# Patient Record
Sex: FEMALE | Race: OTHER | HISPANIC OR LATINO
[De-identification: names, ages, dates, MRNs, and addresses within clinical notes are randomized per-mention and may not be internally consistent; named-entity substitution may affect disease eponyms.]

---

## 2018-04-02 PROBLEM — Z00.00 ENCOUNTER FOR PREVENTIVE HEALTH EXAMINATION: Status: ACTIVE | Noted: 2018-04-02

## 2018-04-09 ENCOUNTER — APPOINTMENT (OUTPATIENT)
Dept: ORTHOPEDIC SURGERY | Facility: CLINIC | Age: 58
End: 2018-04-09
Payer: COMMERCIAL

## 2018-04-09 DIAGNOSIS — Z87.39 PERSONAL HISTORY OF OTHER DISEASES OF THE MUSCULOSKELETAL SYSTEM AND CONNECTIVE TISSUE: ICD-10-CM

## 2018-04-09 DIAGNOSIS — Z80.0 FAMILY HISTORY OF MALIGNANT NEOPLASM OF DIGESTIVE ORGANS: ICD-10-CM

## 2018-04-09 DIAGNOSIS — M65.312 TRIGGER THUMB, LEFT THUMB: ICD-10-CM

## 2018-04-09 DIAGNOSIS — Z87.09 PERSONAL HISTORY OF OTHER DISEASES OF THE RESPIRATORY SYSTEM: ICD-10-CM

## 2018-04-09 DIAGNOSIS — M65.4 RADIAL STYLOID TENOSYNOVITIS [DE QUERVAIN]: ICD-10-CM

## 2018-04-09 PROCEDURE — 99204 OFFICE O/P NEW MOD 45 MIN: CPT | Mod: 25

## 2018-04-09 PROCEDURE — 76882 US LMTD JT/FCL EVL NVASC XTR: CPT | Mod: LT

## 2018-04-09 PROCEDURE — 20550 NJX 1 TENDON SHEATH/LIGAMENT: CPT | Mod: LT

## 2018-04-09 PROCEDURE — 76942 ECHO GUIDE FOR BIOPSY: CPT | Mod: 59,LT

## 2018-04-11 PROBLEM — M65.312 TRIGGER FINGER OF LEFT THUMB: Status: ACTIVE | Noted: 2018-04-09

## 2018-04-11 PROBLEM — M65.4 DE QUERVAIN'S TENOSYNOVITIS, LEFT: Status: ACTIVE | Noted: 2018-04-09

## 2020-01-23 ENCOUNTER — APPOINTMENT (OUTPATIENT)
Dept: THORACIC SURGERY | Facility: CLINIC | Age: 60
End: 2020-01-23
Payer: COMMERCIAL

## 2020-01-23 VITALS
DIASTOLIC BLOOD PRESSURE: 67 MMHG | TEMPERATURE: 98 F | BODY MASS INDEX: 30.21 KG/M2 | WEIGHT: 160 LBS | RESPIRATION RATE: 17 BRPM | HEIGHT: 61 IN | SYSTOLIC BLOOD PRESSURE: 104 MMHG | OXYGEN SATURATION: 97 % | HEART RATE: 75 BPM

## 2020-01-23 DIAGNOSIS — D21.9 BENIGN NEOPLASM OF CONNECTIVE AND OTHER SOFT TISSUE, UNSPECIFIED: ICD-10-CM

## 2020-01-23 PROCEDURE — 99205 OFFICE O/P NEW HI 60 MIN: CPT

## 2020-01-24 PROBLEM — D21.9 ELASTOFIBROMA: Status: ACTIVE | Noted: 2020-01-24

## 2020-01-24 RX ORDER — OXYBUTYNIN CHLORIDE 2.5 MG/1
TABLET ORAL
Refills: 0 | Status: ACTIVE | COMMUNITY

## 2020-01-24 RX ORDER — ATORVASTATIN CALCIUM 20 MG/1
20 TABLET, FILM COATED ORAL
Refills: 0 | Status: ACTIVE | COMMUNITY

## 2020-01-24 RX ORDER — PROPRANOLOL HYDROCHLORIDE 80 MG/1
TABLET ORAL
Refills: 0 | Status: ACTIVE | COMMUNITY

## 2020-01-27 NOTE — CONSULT LETTER
[Dear  ___] : Dear  [unfilled], [( Thank you for referring [unfilled] for consultation for _____ )] : Thank you for referring [unfilled] for consultation for [unfilled] [Consult Letter:] : I had the pleasure of evaluating your patient, [unfilled]. [Please see my note below.] : Please see my note below. [Consult Closing:] : Thank you very much for allowing me to participate in the care of this patient.  If you have any questions, please do not hesitate to contact me. [Sincerely,] : Sincerely, [FreeTextEntry2] : Dr. Boom Dougherty (GI/ref)\par Dr. Edward Perkins (CT Sx) [FreeTextEntry3] : Demarco Gonzales MD, MPH \par System Director of Thoracic Surgery \par Director of Comprehensive Lung and Foregut [place] \par Professor Cardiovascular & Thoracic Surgery  \par [facility] [employer] at [facility]\par

## 2020-01-27 NOTE — HISTORY OF PRESENT ILLNESS
[FreeTextEntry1] : Ms. YULISSA TSE, 59 year old female, never smoker, w/ hx of HLD, who presented to PCP for persistent back pain. \par \par MRI Chest wall on 12/12/19:\par - well-circumscribed crescent shaped lesions seen deep to the posterolateral serratus anterior musculature bilaterally, 5.6 cm on Rt and 4.3 on the Lt, c/w with elatofibromas\par \par Of note, Pt has seen Dr. Edward Perkins at [place], was recommended for surgery. \par \par Pt presents today for 2nd opinion, referred by Dr. Boom Dougherty. Pt admits to pain to the back, denies SOB, cough, fever, chills, weight loss or CP. \par

## 2020-01-27 NOTE — ASSESSMENT
[FreeTextEntry1] : Ms. YULISSA TSE, 59 year old female, never smoker, w/ hx of HLD, who presented to PCP for persistent back pain. \par \par MRI Chest wall on 12/12/19:\par - well-circumscribed crescent shaped lesions seen deep to the posterolateral serratus anterior musculature bilaterally, 5.6 cm on Rt and 4.3 on the Lt, c/w with elatofibromas\par \par Of note, Pt has seen Dr. Edward Perkins at [place], was recommended for surgery. As per pt, there are two masses on the Rt side and Dr. Perkins would remove two masses at the same time. However, there is only one mass noted on MRI. \par \par I have reviewed the patient's medical records and diagnostic images at time of this office consultation and have made the following recommendation:\par 1. MRI reviewed with pt, only one soft tissue mass noted to Rt side of chest wall. Pt will bring old CT scan to review and compare. I recommended resection of Rt chest wall soft tissue mass on 2/20/20. Risks and benefits and alternatives explained to patient, all questions answered. \par 2. Medical clearance \par 3. CT Chest w/contrast. \par \par \par I personally performed the services described in the documentation, reviewed the documentation recorded by the scribe in my presence and it accurately and completely records my words and actions. \par \par I, Kareen Foss NP, am scribing for and the presence of Dr. Demarco Gonzales the following sections, HISTORY OF PRESENT ILLNESS, PAST MEDICAL/FAMILY/SOCIAL HISTORY; REVIEW OF SYSTEMS; VITAL SIGNS; PHYSICAL EXAM; DISPOSITION.\par

## 2020-01-27 NOTE — PHYSICAL EXAM
[General Appearance - Alert] : alert [General Appearance - In No Acute Distress] : in no acute distress [Sclera] : the sclera and conjunctiva were normal [PERRL With Normal Accommodation] : pupils were equal in size, round, and reactive to light [Extraocular Movements] : extraocular movements were intact [Outer Ear] : the ears and nose were normal in appearance [Oropharynx] : the oropharynx was normal [Neck Appearance] : the appearance of the neck was normal [Neck Cervical Mass (___cm)] : no neck mass was observed [Jugular Venous Distention Increased] : there was no jugular-venous distention [Thyroid Diffuse Enlargement] : the thyroid was not enlarged [Thyroid Nodule] : there were no palpable thyroid nodules [Auscultation Breath Sounds / Voice Sounds] : lungs were clear to auscultation bilaterally [Heart Rate And Rhythm] : heart rate was normal and rhythm regular [Heart Sounds] : normal S1 and S2 [Heart Sounds Gallop] : no gallops [Murmurs] : no murmurs [Heart Sounds Pericardial Friction Rub] : no pericardial rub [Chest Visual Inspection Thoracic Asymmetry] : no chest asymmetry [Examination Of The Chest] : the chest was normal in appearance [Diminished Respiratory Excursion] : normal chest expansion [2+] : left 2+ [Breast Palpation Mass] : no palpable masses [Breast Appearance] : normal in appearance [Bowel Sounds] : normal bowel sounds [Abdomen Soft] : soft [Abdomen Tenderness] : non-tender [Abdomen Mass (___ Cm)] : no abdominal mass palpated [Cervical Lymph Nodes Enlarged Posterior Bilaterally] : posterior cervical [Cervical Lymph Nodes Enlarged Anterior Bilaterally] : anterior cervical [Supraclavicular Lymph Nodes Enlarged Bilaterally] : supraclavicular [No CVA Tenderness] : no ~M costovertebral angle tenderness [No Spinal Tenderness] : no spinal tenderness [Abnormal Walk] : normal gait [Nail Clubbing] : no clubbing  or cyanosis of the fingernails [Musculoskeletal - Swelling] : no joint swelling seen [Motor Tone] : muscle strength and tone were normal [Skin Color & Pigmentation] : normal skin color and pigmentation [Skin Turgor] : normal skin turgor [] : no rash [Deep Tendon Reflexes (DTR)] : deep tendon reflexes were 2+ and symmetric [No Focal Deficits] : no focal deficits [Sensation] : the sensory exam was normal to light touch and pinprick [Oriented To Time, Place, And Person] : oriented to person, place, and time [Impaired Insight] : insight and judgment were intact [Affect] : the affect was normal [FreeTextEntry1] : deferred

## 2020-01-27 NOTE — DATA REVIEWED
[FreeTextEntry1] : MRI Chest wall on 12/12/19:\par - well-circumscribed crescent shaped lesions seen deep to the posterolateral serratus anterior musculature bilaterally, 5.6 cm on Rt and 4.3 on the Lt, c/w with elatofibromas

## 2024-01-30 PROBLEM — K21.9 GERD (GASTROESOPHAGEAL REFLUX DISEASE): Status: ACTIVE | Noted: 2024-01-30

## 2024-02-01 ENCOUNTER — APPOINTMENT (OUTPATIENT)
Dept: THORACIC SURGERY | Facility: CLINIC | Age: 64
End: 2024-02-01
Payer: COMMERCIAL

## 2024-02-01 VITALS
TEMPERATURE: 97 F | HEART RATE: 79 BPM | RESPIRATION RATE: 17 BRPM | SYSTOLIC BLOOD PRESSURE: 124 MMHG | OXYGEN SATURATION: 97 % | DIASTOLIC BLOOD PRESSURE: 75 MMHG | WEIGHT: 172 LBS | HEIGHT: 61 IN | BODY MASS INDEX: 32.47 KG/M2

## 2024-02-01 DIAGNOSIS — K21.9 GASTRO-ESOPHAGEAL REFLUX DISEASE W/OUT ESOPHAGITIS: ICD-10-CM

## 2024-02-01 DIAGNOSIS — Z86.39 PERSONAL HISTORY OF OTHER ENDOCRINE, NUTRITIONAL AND METABOLIC DISEASE: ICD-10-CM

## 2024-02-01 PROCEDURE — 99205 OFFICE O/P NEW HI 60 MIN: CPT

## 2024-02-01 RX ORDER — ASPIRIN 325 MG/1
TABLET, FILM COATED ORAL
Refills: 0 | Status: DISCONTINUED | COMMUNITY
End: 2024-02-01

## 2024-02-01 RX ORDER — PANTOPRAZOLE SODIUM 40 MG/1
40 TABLET, DELAYED RELEASE ORAL
Refills: 0 | Status: ACTIVE | COMMUNITY

## 2024-02-01 NOTE — PHYSICAL EXAM
[Fully active, able to carry on all pre-disease performance without restriction] : Status 0 - Fully active, able to carry on all pre-disease performance without restriction [General Appearance - Alert] : alert [General Appearance - In No Acute Distress] : in no acute distress [General Appearance - Well Nourished] : well nourished [Sclera] : the sclera and conjunctiva were normal [PERRL With Normal Accommodation] : pupils were equal in size, round, and reactive to light [Outer Ear] : the ears and nose were normal in appearance [Neck Appearance] : the appearance of the neck was normal [Neck Cervical Mass (___cm)] : no neck mass was observed [Exaggerated Use Of Accessory Muscles For Inspiration] : no accessory muscle use [Respiration, Rhythm And Depth] : normal respiratory rhythm and effort [Auscultation Breath Sounds / Voice Sounds] : lungs were clear to auscultation bilaterally [Apical Impulse] : the apical impulse was normal [Heart Rate And Rhythm] : heart rate was normal and rhythm regular [Heart Sounds] : normal S1 and S2 [Examination Of The Chest] : the chest was normal in appearance [No Pulse Delay] : no pulse delay [Bowel Sounds] : normal bowel sounds [Abdomen Soft] : soft [Abdomen Tenderness] : non-tender [No CVA Tenderness] : no ~M costovertebral angle tenderness [Abnormal Walk] : normal gait [Involuntary Movements] : no involuntary movements were seen [Musculoskeletal - Swelling] : no joint swelling seen [Skin Color & Pigmentation] : normal skin color and pigmentation [Skin Turgor] : normal skin turgor [] : no rash [No Focal Deficits] : no focal deficits [Oriented To Time, Place, And Person] : oriented to person, place, and time [Impaired Insight] : insight and judgment were intact [Affect] : the affect was normal [Mood] : the mood was normal

## 2024-02-01 NOTE — CONSULT LETTER
[Dear  ___] : Dear  [unfilled], [Consult Letter:] : I had the pleasure of evaluating your patient, [unfilled]. [( Thank you for referring [unfilled] for consultation for _____ )] : Thank you for referring [unfilled] for consultation for [unfilled] [Please see my note below.] : Please see my note below. [Consult Closing:] : Thank you very much for allowing me to participate in the care of this patient.  If you have any questions, please do not hesitate to contact me. [FreeTextEntry2] : Dr. Boom Dougherty [FreeTextEntry3] : Demarco Gonzales MD, MPH  System Director of Thoracic Surgery  Director of Comprehensive Lung and Foregut [place]  Professor Cardiovascular & Thoracic Surgery  [facility] [employer] at [facility] [phone] [place] [address] [place] [address] Tel: [phone] Fax: [phone]

## 2024-02-01 NOTE — ASSESSMENT
[FreeTextEntry1] : Ms. YULISSA TSE, 63 year old female, never smoker, w/ hx of asthma, arthritis, osteoporosis, HLD, s/p right chest wall resection of elatofibroma in 3/2020 at [facility], who presented with chronic acid reflux for many years with recent worsening symptoms, controlled by PPI.   EGD on 12/10/22 by Dr. Boom Dougherty, which revealed: - GE junction is located at 36 cm from the incisors. Small hiatal hernia.    I have reviewed the patient's medical records and diagnostic images at the time of this office consultation and have made the following recommendation. Plan: 1. EGD, laparoscopy, robotic assist, hiatal hernia repair, Toupet fundoplication on 2/27/24. All risks vs. benefits and alternatives were explained to the patient, all questions were answered, patient verbalized understanding, was in agreement with the plan to proceed. 2. CT chest w/o contrast for further evaluation.  3. Upper GI series.  4. Refer to Dr. Patsy Florian for manometry.  5. Medical clearance and PST.     I, KAROLYN Yoon, personally performed the evaluation and management (E/M) services for this established patient who follow up today with an existing condition.  That E/M includes conducting the examination, assessing all new/exacerbated/existing conditions, and establishing a plan of care. Today, my ACP, Christina Stewart NP, was here to observe my evaluation and management services for this existing condition to be followed going forward.

## 2024-02-01 NOTE — HISTORY OF PRESENT ILLNESS
[FreeTextEntry1] : Ms. YULISSA TSE, 63 year old female, never smoker, w/ hx of asthma, arthritis, osteoporosis, HLD, s/p right chest wall resection of elatofibroma in 3/2020 at [facility], who presented with chronic acid reflux for many years with recent worsening symptoms, controlled by PPI.   EGD on 12/10/22 by Dr. Boom Dougherty, which revealed: - GE junction is located at 36 cm from the incisors. Small hiatal hernia.   She presents today for CT sx consultation. She is referred by Dr. Boom Dougherty. She admits acid reflux, mild cough. The patient denies SOB, cough, chest pain, hemoptysis, palpitation, fever, recent illness.

## 2024-02-02 ENCOUNTER — NON-APPOINTMENT (OUTPATIENT)
Age: 64
End: 2024-02-02

## 2024-02-05 ENCOUNTER — NON-APPOINTMENT (OUTPATIENT)
Age: 64
End: 2024-02-05

## 2024-02-06 ENCOUNTER — APPOINTMENT (OUTPATIENT)
Dept: GASTROENTEROLOGY | Facility: CLINIC | Age: 64
End: 2024-02-06

## 2024-02-07 ENCOUNTER — APPOINTMENT (OUTPATIENT)
Dept: GASTROENTEROLOGY | Facility: CLINIC | Age: 64
End: 2024-02-07
Payer: COMMERCIAL

## 2024-02-07 PROCEDURE — 99442: CPT

## 2024-02-13 ENCOUNTER — APPOINTMENT (OUTPATIENT)
Dept: GASTROENTEROLOGY | Facility: HOSPITAL | Age: 64
End: 2024-02-13

## 2024-02-13 ENCOUNTER — OUTPATIENT (OUTPATIENT)
Dept: OUTPATIENT SERVICES | Facility: HOSPITAL | Age: 64
LOS: 1 days | Discharge: HOME | End: 2024-02-13
Payer: COMMERCIAL

## 2024-02-13 VITALS
SYSTOLIC BLOOD PRESSURE: 124 MMHG | RESPIRATION RATE: 20 BRPM | DIASTOLIC BLOOD PRESSURE: 81 MMHG | WEIGHT: 169.98 LBS | OXYGEN SATURATION: 100 % | HEART RATE: 64 BPM | TEMPERATURE: 98 F | HEIGHT: 62 IN

## 2024-02-13 DIAGNOSIS — K21.9 GASTRO-ESOPHAGEAL REFLUX DISEASE WITHOUT ESOPHAGITIS: ICD-10-CM

## 2024-02-13 PROCEDURE — 91037 ESOPH IMPED FUNCTION TEST: CPT | Mod: 26

## 2024-02-13 PROCEDURE — 45337 SIGMOIDOSCOPY & DECOMPRESS: CPT

## 2024-02-13 PROCEDURE — 91010 ESOPHAGUS MOTILITY STUDY: CPT | Mod: 26

## 2024-02-28 ENCOUNTER — OUTPATIENT (OUTPATIENT)
Dept: OUTPATIENT SERVICES | Facility: HOSPITAL | Age: 64
LOS: 1 days | End: 2024-02-28

## 2024-02-28 VITALS
DIASTOLIC BLOOD PRESSURE: 76 MMHG | HEIGHT: 61 IN | HEART RATE: 51 BPM | TEMPERATURE: 98 F | WEIGHT: 171.08 LBS | OXYGEN SATURATION: 96 % | RESPIRATION RATE: 17 BRPM | SYSTOLIC BLOOD PRESSURE: 136 MMHG

## 2024-02-28 DIAGNOSIS — Z98.890 OTHER SPECIFIED POSTPROCEDURAL STATES: Chronic | ICD-10-CM

## 2024-02-28 DIAGNOSIS — D49.89 NEOPLASM OF UNSPECIFIED BEHAVIOR OF OTHER SPECIFIED SITES: Chronic | ICD-10-CM

## 2024-02-28 DIAGNOSIS — K44.9 DIAPHRAGMATIC HERNIA WITHOUT OBSTRUCTION OR GANGRENE: ICD-10-CM

## 2024-02-28 LAB
BLD GP AB SCN SERPL QL: NEGATIVE — SIGNIFICANT CHANGE UP
RH IG SCN BLD-IMP: POSITIVE — SIGNIFICANT CHANGE UP
RH IG SCN BLD-IMP: POSITIVE — SIGNIFICANT CHANGE UP

## 2024-02-28 NOTE — H&P PST ADULT - NSICDXPASTSURGICALHX_GEN_ALL_CORE_FT
PAST SURGICAL HISTORY:  H/O arthroscopy of right knee     Neoplasm of thorax     S/P trigger finger release      PAST SURGICAL HISTORY:  H/O arthroscopy of right knee     H/O nasal polypectomy     History of esophagogastroduodenoscopy (EGD)     Neoplasm of thorax     S/P trigger finger release

## 2024-02-28 NOTE — H&P PST ADULT - HISTORY OF PRESENT ILLNESS
62 y/o F with H/O: HLD, Migraines , GERD presents for pre o evaluation   c/o persistent cough since last year. Eval by pulm - all tests negative. Lungs clear". Pt was referred to surgeon by GI for further consultation.  62 y/o F with H/O: HLD, Migraines, GERD presents for pre o evaluation with c/o persistent cough since last year. Eval by pulm - all tests negative. Lungs clear". Pt was referred to surgeon by GI for further consultation. Pre op diagnosis: diaphragmatic hernia w/o obstruction or gangrene. Now schedule for Esophagogastroduodenoscopy laparoscopic robotic assisted hiatal hernia repair, Toupet fundoplication tentatively on 03/05/2024

## 2024-02-28 NOTE — H&P PST ADULT - NSICDXPASTMEDICALHX_GEN_ALL_CORE_FT
PAST MEDICAL HISTORY:  HLD (hyperlipidemia)     Migraines      PAST MEDICAL HISTORY:  Diaphragmatic hernia without obstruction and without gangrene     HLD (hyperlipidemia)     Migraines

## 2024-02-28 NOTE — H&P PST ADULT - CARDIOVASCULAR
regular rate and rhythm/S1 S2 present/no gallops details… regular rate and rhythm/S1 S2 present/no gallops/no murmur/no pedal edema

## 2024-02-28 NOTE — H&P PST ADULT - PROBLEM SELECTOR PLAN 1
Schedule for Esophagogastroduodenoscopy laparoscopic robotic assisted hiatal hernia repair, Toupet fundoplication tentatively on 03/05/24. Pre op instructions, chlorhexidine gluconate soap given and explained. Pt verbalized understanding.

## 2024-03-04 ENCOUNTER — TRANSCRIPTION ENCOUNTER (OUTPATIENT)
Age: 64
End: 2024-03-04

## 2024-03-04 NOTE — ASU PATIENT PROFILE, ADULT - NSICDXPASTMEDICALHX_GEN_ALL_CORE_FT
PAST MEDICAL HISTORY:  Diaphragmatic hernia without obstruction and without gangrene     HLD (hyperlipidemia)     Migraines

## 2024-03-04 NOTE — ASU PATIENT PROFILE, ADULT - FALL HARM RISK - UNIVERSAL INTERVENTIONS
Bed in lowest position, wheels locked, appropriate side rails in place/Call bell, personal items and telephone in reach/Instruct patient to call for assistance before getting out of bed or chair/Non-slip footwear when patient is out of bed/[place] to call system/Physically safe environment - no spills, clutter or unnecessary equipment/Purposeful Proactive Rounding/Room/bathroom lighting operational, light cord in reach

## 2024-03-04 NOTE — ASU PATIENT PROFILE, ADULT - NSICDXPASTSURGICALHX_GEN_ALL_CORE_FT
PAST SURGICAL HISTORY:  H/O arthroscopy of right knee     H/O nasal polypectomy     History of esophagogastroduodenoscopy (EGD)     Neoplasm of thorax     S/P trigger finger release

## 2024-03-05 ENCOUNTER — RESULT REVIEW (OUTPATIENT)
Age: 64
End: 2024-03-05

## 2024-03-05 ENCOUNTER — APPOINTMENT (OUTPATIENT)
Dept: THORACIC SURGERY | Facility: HOSPITAL | Age: 64
End: 2024-03-05

## 2024-03-05 ENCOUNTER — TRANSCRIPTION ENCOUNTER (OUTPATIENT)
Age: 64
End: 2024-03-05

## 2024-03-05 ENCOUNTER — INPATIENT (INPATIENT)
Facility: HOSPITAL | Age: 64
LOS: 0 days | Discharge: HOME | End: 2024-03-06
Attending: THORACIC SURGERY (CARDIOTHORACIC VASCULAR SURGERY) | Admitting: THORACIC SURGERY (CARDIOTHORACIC VASCULAR SURGERY)
Payer: COMMERCIAL

## 2024-03-05 VITALS
RESPIRATION RATE: 16 BRPM | SYSTOLIC BLOOD PRESSURE: 114 MMHG | OXYGEN SATURATION: 100 % | WEIGHT: 171.08 LBS | DIASTOLIC BLOOD PRESSURE: 68 MMHG | HEART RATE: 65 BPM | HEIGHT: 61 IN | TEMPERATURE: 98 F

## 2024-03-05 DIAGNOSIS — Z98.890 OTHER SPECIFIED POSTPROCEDURAL STATES: Chronic | ICD-10-CM

## 2024-03-05 DIAGNOSIS — D49.89 NEOPLASM OF UNSPECIFIED BEHAVIOR OF OTHER SPECIFIED SITES: Chronic | ICD-10-CM

## 2024-03-05 DIAGNOSIS — K44.9 DIAPHRAGMATIC HERNIA WITHOUT OBSTRUCTION OR GANGRENE: ICD-10-CM

## 2024-03-05 PROCEDURE — 71045 X-RAY EXAM CHEST 1 VIEW: CPT | Mod: 26

## 2024-03-05 PROCEDURE — 43281 LAP PARAESOPHAG HERN REPAIR: CPT | Mod: AS

## 2024-03-05 PROCEDURE — S2900 ROBOTIC SURGICAL SYSTEM: CPT | Mod: NC

## 2024-03-05 PROCEDURE — 43235 EGD DIAGNOSTIC BRUSH WASH: CPT | Mod: 59

## 2024-03-05 PROCEDURE — 43281 LAP PARAESOPHAG HERN REPAIR: CPT

## 2024-03-05 PROCEDURE — 93010 ELECTROCARDIOGRAM REPORT: CPT

## 2024-03-05 PROCEDURE — 88302 TISSUE EXAM BY PATHOLOGIST: CPT | Mod: 26

## 2024-03-05 DEVICE — LIGATING CLIPS WECK HEMOLOK POLYMER LARGE (PURPLE) 6: Type: IMPLANTABLE DEVICE | Status: FUNCTIONAL

## 2024-03-05 RX ORDER — ONDANSETRON 8 MG/1
4 TABLET, FILM COATED ORAL ONCE
Refills: 0 | Status: DISCONTINUED | OUTPATIENT
Start: 2024-03-05 | End: 2024-03-05

## 2024-03-05 RX ORDER — HYDROMORPHONE HYDROCHLORIDE 2 MG/ML
30 INJECTION INTRAMUSCULAR; INTRAVENOUS; SUBCUTANEOUS
Refills: 0 | Status: DISCONTINUED | OUTPATIENT
Start: 2024-03-05 | End: 2024-03-06

## 2024-03-05 RX ORDER — METOCLOPRAMIDE HCL 10 MG
10 TABLET ORAL EVERY 6 HOURS
Refills: 0 | Status: DISCONTINUED | OUTPATIENT
Start: 2024-03-05 | End: 2024-03-06

## 2024-03-05 RX ORDER — HYDROMORPHONE HYDROCHLORIDE 2 MG/ML
0.5 INJECTION INTRAMUSCULAR; INTRAVENOUS; SUBCUTANEOUS
Refills: 0 | Status: DISCONTINUED | OUTPATIENT
Start: 2024-03-05 | End: 2024-03-06

## 2024-03-05 RX ORDER — HYDROMORPHONE HYDROCHLORIDE 2 MG/ML
1 INJECTION INTRAMUSCULAR; INTRAVENOUS; SUBCUTANEOUS
Refills: 0 | Status: DISCONTINUED | OUTPATIENT
Start: 2024-03-05 | End: 2024-03-05

## 2024-03-05 RX ORDER — ACETAMINOPHEN 500 MG
1000 TABLET ORAL ONCE
Refills: 0 | Status: COMPLETED | OUTPATIENT
Start: 2024-03-05 | End: 2024-03-05

## 2024-03-05 RX ORDER — NALOXONE HYDROCHLORIDE 4 MG/.1ML
0.1 SPRAY NASAL
Refills: 0 | Status: DISCONTINUED | OUTPATIENT
Start: 2024-03-05 | End: 2024-03-06

## 2024-03-05 RX ORDER — HEPARIN SODIUM 5000 [USP'U]/ML
5000 INJECTION INTRAVENOUS; SUBCUTANEOUS EVERY 8 HOURS
Refills: 0 | Status: DISCONTINUED | OUTPATIENT
Start: 2024-03-05 | End: 2024-03-06

## 2024-03-05 RX ORDER — ONDANSETRON 8 MG/1
4 TABLET, FILM COATED ORAL EVERY 6 HOURS
Refills: 0 | Status: DISCONTINUED | OUTPATIENT
Start: 2024-03-05 | End: 2024-03-06

## 2024-03-05 RX ORDER — HYDROMORPHONE HYDROCHLORIDE 2 MG/ML
0.5 INJECTION INTRAMUSCULAR; INTRAVENOUS; SUBCUTANEOUS
Refills: 0 | Status: DISCONTINUED | OUTPATIENT
Start: 2024-03-05 | End: 2024-03-05

## 2024-03-05 RX ORDER — SODIUM CHLORIDE 9 MG/ML
1000 INJECTION, SOLUTION INTRAVENOUS
Refills: 0 | Status: DISCONTINUED | OUTPATIENT
Start: 2024-03-05 | End: 2024-03-06

## 2024-03-05 RX ORDER — SODIUM CHLORIDE 9 MG/ML
1000 INJECTION, SOLUTION INTRAVENOUS
Refills: 0 | Status: DISCONTINUED | OUTPATIENT
Start: 2024-03-05 | End: 2024-03-05

## 2024-03-05 RX ORDER — ONDANSETRON 8 MG/1
4 TABLET, FILM COATED ORAL EVERY 6 HOURS
Refills: 0 | Status: DISCONTINUED | OUTPATIENT
Start: 2024-03-05 | End: 2024-03-05

## 2024-03-05 RX ORDER — HEPARIN SODIUM 5000 [USP'U]/ML
5000 INJECTION INTRAVENOUS; SUBCUTANEOUS ONCE
Refills: 0 | Status: COMPLETED | OUTPATIENT
Start: 2024-03-05 | End: 2024-03-05

## 2024-03-05 RX ADMIN — HYDROMORPHONE HYDROCHLORIDE 30 MILLILITER(S): 2 INJECTION INTRAMUSCULAR; INTRAVENOUS; SUBCUTANEOUS at 12:03

## 2024-03-05 RX ADMIN — Medication 10 MILLIGRAM(S): at 14:44

## 2024-03-05 RX ADMIN — SODIUM CHLORIDE 75 MILLILITER(S): 9 INJECTION, SOLUTION INTRAVENOUS at 11:59

## 2024-03-05 RX ADMIN — ONDANSETRON 4 MILLIGRAM(S): 8 TABLET, FILM COATED ORAL at 23:48

## 2024-03-05 RX ADMIN — ONDANSETRON 4 MILLIGRAM(S): 8 TABLET, FILM COATED ORAL at 18:42

## 2024-03-05 RX ADMIN — HYDROMORPHONE HYDROCHLORIDE 30 MILLILITER(S): 2 INJECTION INTRAMUSCULAR; INTRAVENOUS; SUBCUTANEOUS at 17:19

## 2024-03-05 RX ADMIN — Medication 400 MILLIGRAM(S): at 23:47

## 2024-03-05 RX ADMIN — HEPARIN SODIUM 5000 UNIT(S): 5000 INJECTION INTRAVENOUS; SUBCUTANEOUS at 21:28

## 2024-03-05 RX ADMIN — Medication 10 MILLIGRAM(S): at 21:28

## 2024-03-05 RX ADMIN — HEPARIN SODIUM 5000 UNIT(S): 5000 INJECTION INTRAVENOUS; SUBCUTANEOUS at 07:45

## 2024-03-05 RX ADMIN — HYDROMORPHONE HYDROCHLORIDE 30 MILLILITER(S): 2 INJECTION INTRAMUSCULAR; INTRAVENOUS; SUBCUTANEOUS at 19:13

## 2024-03-05 RX ADMIN — HYDROMORPHONE HYDROCHLORIDE 30 MILLILITER(S): 2 INJECTION INTRAMUSCULAR; INTRAVENOUS; SUBCUTANEOUS at 17:44

## 2024-03-05 RX ADMIN — ONDANSETRON 4 MILLIGRAM(S): 8 TABLET, FILM COATED ORAL at 12:03

## 2024-03-05 NOTE — DISCHARGE NOTE PROVIDER - NSDCFUADDINST_GEN_ALL_CORE_FT
Please walk 4-5 x per day; increase as tolerated. You may climb stairs. Continue to use the incentive spirometer.   You may keep wounds uncovered. Please shower daily with soap and water. The suture will be removed in the office at the follow up appointment.   Please call the office at [phone] if you have fevers, chills, worsening shortness of breath, chest pain, warmth, redness or purulent discharge from the wound.

## 2024-03-05 NOTE — DISCHARGE NOTE PROVIDER - HOSPITAL COURSE
62 y/o F with H/O: HLD, Migraines, GERD  c/o persistent cough since last year. Eval by pulm - all tests negative. Lungs clear". Pt was referred to surgeon by GI for further consultation. Pre op diagnosis: diaphragmatic hernia w/o obstruction or gangrene. Patient s/p EGD, robotic assisted hiatal hernia repair with Toupet fundoplication on 3/5/24.  Post op course without complication, patient stable for discharge home when tolerating po diet after swallow study done. 62 y/o F with H/O: HLD, Migraines, GERD  c/o persistent cough since last year. Eval by pulm - all tests negative. Lungs clear". Pt was referred to surgeon by GI for further consultation. Pre op diagnosis: diaphragmatic hernia w/o obstruction or gangrene. Patient s/p EGD, robotic assisted hiatal hernia repair with Toupet fundoplication on 3/5/24.  Post op course without complication, patient stable for discharge home when tolerating po diet after swallow study done.  Pt seen and examined by thoracic surgery team and presented to attending on day of discharge.    Vital Signs Last 24 Hrs  T(C): 36.6 (06 Mar 2024 05:38), Max: 36.9 (05 Mar 2024 16:55)  T(F): 97.8 (06 Mar 2024 05:38), Max: 98.5 (05 Mar 2024 16:55)  HR: 93 (06 Mar 2024 05:38) (66 - 98)  BP: 135/70 (06 Mar 2024 05:38) (116/70 - 151/74)  BP(mean): 89 (05 Mar 2024 17:00) (84 - 98)  RR: 18 (06 Mar 2024 05:38) (12 - 20)  SpO2: 98% (06 Mar 2024 05:38) (93% - 100%)    Parameters below as of 06 Mar 2024 05:38  Patient On (Oxygen Delivery Method): room air      PHYSICAL EXAM:  Gen: NAD  Resp: Respirations unlabored. Bilateral chest rise.   Card: RRR.   GI: Soft. Nontender. Nondistended.   Skin: Warm, well perfused, no masses or organomegaly  EXT: No clubbing, cyanosis, or edema  site: c,d,i

## 2024-03-05 NOTE — ASU PREOP CHECKLIST - AS TEMP SITE
Results letter mailed to patient per   Colon recall entered for repeat in 3 yrs,2/15/2025  Colon done in 2/15/2022   Updated and Patient Outreach was placed for Colon recall  Candelario Cook MD  P Em Gi Clinical Staff  GI RNs - 1.  Please print and mail this letter to patient; 2. Recall for colonoscopy exam in 3 years
oral

## 2024-03-05 NOTE — BRIEF OPERATIVE NOTE - NSICDXBRIEFPROCEDURE_GEN_ALL_CORE_FT
PROCEDURES:  EGD 05-Mar-2024 11:28:16  Ely Baker  Robot-assisted laparoscopic repair of hiatal hernia with Toupet fundoplication using da Vandana Xi 05-Mar-2024 11:29:20  Ely Baker

## 2024-03-05 NOTE — CHART NOTE - NSCHARTNOTEFT_GEN_A_CORE
Patient S/P EGD, robotic assisted hiatal hernia repair with Toupet fundoplication.  Patient complaining of incisional pain, IV PCA in place.  Incision with dressing clean and dry.  NPO maintained, patient voiding.   Vital Signs Last 24 Hrs  T(C): 36.9 (05 Mar 2024 16:55), Max: 36.9 (05 Mar 2024 16:55)  T(F): 98.5 (05 Mar 2024 16:55), Max: 98.5 (05 Mar 2024 16:55)  HR: 88 (05 Mar 2024 17:00) (65 - 88)  BP: 128/72 (05 Mar 2024 17:00) (114/68 - 151/74)  BP(mean): 89 (05 Mar 2024 17:00) (84 - 98)  RR: 15 (05 Mar 2024 17:00) (12 - 20)  SpO2: 97% (05 Mar 2024 17:00) (93% - 100%)    Parameters below as of 05 Mar 2024 17:00  Patient On (Oxygen Delivery Method): nasal cannula  O2 Flow (L/min): 2    I&O's Detail    05 Mar 2024 07:01  -  05 Mar 2024 19:03  --------------------------------------------------------  IN:    Lactated Ringers: 450 mL  Total IN: 450 mL    OUT:  Total OUT: 0 mL    Total NET: 450 mL      MEDICATIONS  (STANDING):  heparin   Injectable 5000 Unit(s) SubCutaneous every 8 hours  HYDROmorphone PCA (1 mG/mL) 30 milliLiter(s) PCA Continuous PCA Continuous  lactated ringers. 1000 milliLiter(s) (75 mL/Hr) IV Continuous <Continuous>  metoclopramide Injectable 10 milliGRAM(s) IV Push every 6 hours  ondansetron Injectable 4 milliGRAM(s) IV Push every 6 hours    A/P: S/P EGD, robotic assisted Hiatal hernia repair with Toupet fundoplication  NPO, barium swallow study in am   Will advance diet based on result of swallow study   Follow up labs and CXR in am   Chest PT, ambulation and incentive spirometer   Continue IV PCA for pain management

## 2024-03-05 NOTE — DISCHARGE NOTE PROVIDER - INSTRUCTIONS
clear liquid diet x 2 days  then progress to full liquid diet x 2 days   then progress to soft liquid diet until follow up with Dr Gonzales    no carbonated beverages  sit up straight during meals and keep head of bed at least 30* even when sleeping

## 2024-03-05 NOTE — BRIEF OPERATIVE NOTE - OPERATION/FINDINGS
Hill Grade II Hiatal Hernia with pre-op EGD showing Z-line at 36cm from the lip and pinch at 39cm and post-operative EGD showing Z-line at 39cm.    s/p EGD, robot assisted laparoscopic hiatal hernia repair with toupet fundoplication

## 2024-03-05 NOTE — PATIENT PROFILE ADULT - FALL HARM RISK - UNIVERSAL INTERVENTIONS
Bed in lowest position, wheels locked, appropriate side rails in place/Call bell, personal items and telephone in reach/Instruct patient to call for assistance before getting out of bed or chair/Non-slip footwear when patient is out of bed/[place] to call system/Physically safe environment - no spills, clutter or unnecessary equipment/Purposeful Proactive Rounding/Room/bathroom lighting operational, light cord in reach

## 2024-03-05 NOTE — DISCHARGE NOTE PROVIDER - NSDCMRMEDTOKEN_GEN_ALL_CORE_FT
azelastine 137 mcg/inh (0.1%) nasal spray: 2 spray(s) intranasally 2 times a day  Bethametasone 0.05% gel once a day (back and neck):   Calcium 650 mg 1 tab PO in the morning:   diclofenac 1% topical gel: Apply topically to affected area once a day for right knee (Last dose on 2/28/24)  Eye Drops (for dry eye) once a day:   oxyBUTYnin 10 mg/24 hr oral tablet, extended release: 1 tab(s) orally once a day  pantoprazole 40 mg oral delayed release tablet: 1 tab(s) orally 2 times a day  propranolol 40 mg oral tablet: 1 tab(s) orally once a day (at bedtime)  simvastatin 20 mg oral tablet: 1 tab(s) orally once a day  Vitamin D3 1 tab PO daily:    acetaminophen 500 mg oral capsule: 2 cap(s) orally every 6 hours as needed for  mild pain  azelastine 137 mcg/inh (0.1%) nasal spray: 2 spray(s) intranasally 2 times a day  Bethametasone 0.05% gel once a day (back and neck):   Calcium 650 mg 1 tab PO in the morning:   CXR PA and lateral: Q40.1  diclofenac 1% topical gel: Apply topically to affected area once a day for right knee (Last dose on 2/28/24)  Eye Drops (for dry eye) once a day:   oxyBUTYnin 10 mg/24 hr oral tablet, extended release: 1 tab(s) orally once a day  oxyCODONE 5 mg oral tablet: 1 tab(s) orally every 6 hours as needed for  severe pain MDD: 4  propranolol 40 mg oral tablet: 1 tab(s) orally once a day (at bedtime)  simvastatin 20 mg oral tablet: 1 tab(s) orally once a day  Vitamin D3 1 tab PO daily:    acetaminophen 500 mg oral capsule: 2 cap(s) orally every 6 hours as needed for  mild pain  azelastine 137 mcg/inh (0.1%) nasal spray: 2 spray(s) intranasally 2 times a day  Bethametasone 0.05% gel once a day (back and neck):   Calcium 650 mg 1 tab PO in the morning:   diclofenac 1% topical gel: Apply topically to affected area once a day for right knee (Last dose on 2/28/24)  Eye Drops (for dry eye) once a day:   Gas-X 80 mg oral tablet, chewable: 1 tab(s) chewed 3 times a day as needed for  indigestion with meals  oxyBUTYnin 10 mg/24 hr oral tablet, extended release: 1 tab(s) orally once a day  oxyCODONE 5 mg oral tablet: 1 tab(s) orally every 6 hours as needed for  severe pain MDD: 4  propranolol 40 mg oral tablet: 1 tab(s) orally once a day (at bedtime)  simvastatin 20 mg oral tablet: 1 tab(s) orally once a day  Vitamin D3 1 tab PO daily:

## 2024-03-05 NOTE — DISCHARGE NOTE PROVIDER - NSDCFUADDAPPT_GEN_ALL_CORE_FT
Follow up with Dr. Gonzales in 1-2 weeks [phone]   Follow up with primary care provider in one week  Follow up with Dr. Gonzales in 1-2 weeks [phone]   have a chest xray prior to appointment and bring films  (you can arrange to have chest xray done on 2nd floor of [facility] before your follow up appointment on 3rd floor - speak with office staff to arrange)      Follow up with primary care provider in one week

## 2024-03-05 NOTE — PATIENT PROFILE ADULT - FUNCTIONAL ASSESSMENT - DAILY ACTIVITY 3.
Hospitalist Progress Note  Clarice Guillen MD  Answering service: [phone] OR [phone] from in house phone        Date of Service:  2018  NAME:  Zeb Holly  :  1936  MRN:  [id number]      Admission Summary:   Left-sided hip/low back pain along with fever.     HISTORY OF PRESENT ILLNESS:  The patient is a 53-year-old gentleman with past medical history of bladder cancer, BPH, TIA, carotid stenosis status post CEA, hypertension, hyperlipidemia who presents with worsening left-sided low back pain as well as fevers and fatigue. The patient states that he has had pain in his left hip area for about the past year. He notes that it is usually aching, worse with activity, especially lifting his left leg. Does not have any radiation. He has not experienced any numbness or weakness of that leg. He recalls no injury to his low back that would have precipitated this. He has had a very busy year with unfortunately bladder cancer and TIA requiring multiple surgeries and rounds of chemotherapy, so has not really addressed this problem. In the past 24-48 hours though the pain has become much worse, causing him to come into the emergency room today. He was actually just in the emergency room on the  for fatigue and lethargy. He was diagnosed with a UTI at that time and placed on Bactrim. He has been taking the Bactrim at home. He has not noticed any fevers or chills at home. He has had burning with urination, but states that this is essentially constant for him and it is no worse. He has not had any hematuria. No pain in his mid-back or his bladder. No nausea, no vomiting, no diarrhea. He denies any chest pain, but notes that he has been more short of breath both when ambulating and also when lying down flat, to the point where over the past week he has had trouble sleeping secondary to shortness of breath.   He notes a loss of appetite over the past 4-5 weeks ever since his carotid endarterectomy and has lost about 8-9 pounds. He has not had chills or night sweats. The patient does have a history of bladder cancer and that was removed on 12/06/2017. He had chemotherapy and BCG treatments, and has had on and off urinary tract infections ever since then. Interval history / Subjective:       4/22:  MRI, DJD  CT: 1. No acute findings. 2. Calcified bladder mass compatible with history of bladder carcinoma. 3. Enlarged prostate. 4. Left nephrolithiasis. 5. Renal cysts. 6. Bilateral pleural effusions and bibasilar lower lobe opacification.    retroperitoneum:        Assessment & Plan:     Sepsis, improved VS/symptoms. On broad coverage,, Enterococus urine/blood cult on 4/21. H/O TIA not recurrent, cont on ASA     Hypotension in setting of acute sepsis, improved  BP Readings from Last 1 Encounters:   04/22/18 (!) 116/38     Chronic resp failure on home 02 continue same.      Hyperglycemia, mild  follow up lab  Lab Results   Component Value Date/Time    Glucose 145 (H) 04/22/2018 12:51 AM      .   Code status: DNR  DVT prophylaxis: Lovenox     Care Plan discussed with: Patient/Family and Nurse  Disposition: TBD     Hospital Problems  Date Reviewed: 4/18/2018          Codes Class Noted POA    * (Principal)Sepsis ([facility] ICD-10-CM: A41.9  ICD-9-CM: 038.9, 995.91  4/21/2018 Unknown        Malignant neoplasm of urinary bladder ([facility] ICD-10-CM: C67.9  ICD-9-CM: 188.9  2/15/2018 Yes        Prediabetes ICD-10-CM: R73.03  ICD-9-CM: 790.29  11/3/2013 Yes        BPH (benign prostatic hyperplasia) ICD-10-CM: N40.0  ICD-9-CM: 600.00  Unknown Yes    Overview Signed 6/30/2014  1:15 PM by Marcio Colon MD     Orthostatic hypotension w/ Flomax             Hypertension, essential ICD-10-CM: I10  ICD-9-CM: 401.9  Unknown Yes        Hypercholesterolemia ICD-10-CM: E78.00  ICD-9-CM: 272.0  Unknown Yes    Overview Addendum 6/27/2016 12:40 PM by Robert Murillo Yoko Kirby MD     Arthralgia/myalgia w/ lipitor & pravastatin                     Review of Systems:   Pertinent items are noted in HPI. Vital Signs:    Last 24hrs VS reviewed since prior progress note. Most recent are:  Visit Vitals    BP (!) 116/38 (BP 1 Location: Left arm, BP Patient Position: At rest)    Pulse (!) 58    Temp 97.3 °F (36.3 °C)    Resp 13    Ht 5' 9\" (1.753 m)    Wt 85.6 kg (188 lb 11.4 oz)    SpO2 93%    BMI 27.87 kg/m2         Intake/Output Summary (Last 24 hours) at 04/22/18 1346  Last data filed at 04/22/18 1159   Gross per 24 hour   Intake          1256.67 ml   Output              880 ml   Net           376.67 ml        Physical Examination:             Constitutional:  No acute distress, cooperative, pleasant    ENT:  Oral mucous moist, oropharynx benign. Neck supple,    Resp:  CTA bilaterally. No wheezing/rhonchi/rales. No accessory muscle use   CV:  Regular rhythm, normal rate, no murmurs, gallops, rubs    GI:  Soft, non distended, non tender. normoactive bowel sounds, no hepatosplenomegaly     Musculoskeletal:  No edema, warm, 2+ pulses throughout    Neurologic:  Moves all extremities. AAOx3, CN II-XII reviewed     Psych:  Good insight, Not anxious nor agitated.   Skin:  Good turgor, no rashes or ulcers       Data Review:    Review and/or order of clinical lab test      Labs:     Recent Labs      04/22/18   0051  04/21/18   0556   WBC  14.0*  13.1*   HGB  10.9*  11.2*   HCT  33.8*  33.3*   PLT  209  241     Recent Labs      04/22/18   0052  04/22/18   0051  04/21/18   0556   NA   --   135*  135*   K   --   4.3  4.0   CL   --   108  104   CO2   --   16*  22   BUN   --   18  21*   CREA   --   0.95  1.20   GLU   --   145*  141*   CA   --   8.3*  8.5   MG  2.0   --    --    PHOS  3.4   --    --      Recent Labs      04/21/18   0556   SGOT  23   ALT  21   AP  74   TBILI  0.8   TP  6.6   ALB  2.8*   GLOB  3.8   LPSE  44*     Recent Labs      04/22/18   0053   INR  1.2*   PTP  12.5* No results for input(s): FE, TIBC, PSAT, FERR in the last 72 hours. No results found for: FOL, RBCF   No results for input(s): PH, PCO2, PO2 in the last 72 hours.   Recent Labs      04/22/18   0051   CPK  169   CKNDX  3.6*   TROIQ  <0.04     Lab Results   Component Value Date/Time    Cholesterol, total 213 (H) 12/06/2016 08:27 AM    HDL Cholesterol 63 12/06/2016 08:27 AM    LDL, calculated 134 (H) 12/06/2016 08:27 AM    Triglyceride 82 12/06/2016 08:27 AM     No results found for: GLUCPOC  Lab Results   Component Value Date/Time    Color YELLOW/STRAW 04/21/2018 05:56 AM    Appearance CLOUDY (A) 04/21/2018 05:56 AM    Specific gravity 1.018 04/21/2018 05:56 AM    pH (UA) 5.5 04/21/2018 05:56 AM    Protein 30 (A) 04/21/2018 05:56 AM    Glucose NEGATIVE  04/21/2018 05:56 AM    Ketone NEGATIVE  04/21/2018 05:56 AM    Bilirubin NEGATIVE  04/21/2018 05:56 AM    Urobilinogen 0.2 04/21/2018 05:56 AM    Nitrites NEGATIVE  04/21/2018 05:56 AM    Leukocyte Esterase MODERATE (A) 04/21/2018 05:56 AM    Epithelial cells FEW 04/21/2018 05:56 AM    Bacteria 2+ (A) 04/21/2018 05:56 AM    WBC 0-4 04/21/2018 05:56 AM    RBC 0-5 04/21/2018 05:56 AM         Medications Reviewed:     Current Facility-Administered Medications   Medication Dose Route Frequency    levoFLOXacin (LEVAQUIN) 750 mg in D5W IVPB  750 mg IntraVENous Q24H    amitriptyline (ELAVIL) tablet 25 mg  25 mg Oral QHS    aspirin delayed-release tablet 81 mg  81 mg Oral DAILY    pravastatin (PRAVACHOL) tablet 40 mg  40 mg Oral QHS    tamsulosin (FLOMAX) capsule 0.4 mg  0.4 mg Oral DAILY    sodium chloride (NS) flush 5-10 mL  5-10 mL IntraVENous Q8H    sodium chloride (NS) flush 5-10 mL  5-10 mL IntraVENous PRN    acetaminophen (TYLENOL) tablet 650 mg  650 mg Oral Q4H PRN    HYDROcodone-acetaminophen (NORCO) 5-325 mg per tablet 1 Tab  1 Tab Oral Q4H PRN    HYDROmorphone (DILAUDID) injection 0.5 mg  0.5 mg IntraVENous Q4H PRN    naloxone (NARCAN) injection 0.4 mg 0.4 mg IntraVENous PRN    ondansetron (ZOFRAN) injection 4 mg  4 mg IntraVENous Q4H PRN    docusate sodium (COLACE) capsule 100 mg  100 mg Oral BID    enoxaparin (LOVENOX) injection 40 mg  40 mg SubCUTAneous Q24H    lactobac ac& pc-s.therm-b.anim (DEBBIE Q/RISAQUAD)  1 Cap Oral DAILY    lactated Ringers infusion  50 mL/hr IntraVENous CONTINUOUS    albumin human 25% (BUMINATE) solution 25 g  25 g IntraVENous Q6H    Vancomycin - pharmacy to dose   Other Rx Dosing/Monitoring    vancomycin (VANCOCIN) 1,000 mg in 0.9% sodium chloride (MBP/ADV) 250 mL  1,000 mg IntraVENous Q16H     ______________________________________________________________________  EXPECTED LENGTH OF STAY: - - -  ACTUAL LENGTH OF STAY:          1                 Shadia Ware MD 3 = A little assistance

## 2024-03-05 NOTE — DISCHARGE NOTE PROVIDER - NSDCCPTREATMENT_GEN_ALL_CORE_FT
PRINCIPAL PROCEDURE  Procedure: Robot-assisted laparoscopic repair of hiatal hernia with Toupet fundoplication using da Vandana Xi  Findings and Treatment:

## 2024-03-05 NOTE — DISCHARGE NOTE PROVIDER - CARE PROVIDER_API CALL
Demarco Gonzales  Thoracic Surgery  [id number] [place] [address], [address] [place] [address]  Phone: [phone]  Fax: [phone]  Follow Up Time:

## 2024-03-06 ENCOUNTER — TRANSCRIPTION ENCOUNTER (OUTPATIENT)
Age: 64
End: 2024-03-06

## 2024-03-06 ENCOUNTER — NON-APPOINTMENT (OUTPATIENT)
Age: 64
End: 2024-03-06

## 2024-03-06 VITALS
HEART RATE: 92 BPM | TEMPERATURE: 98 F | OXYGEN SATURATION: 99 % | SYSTOLIC BLOOD PRESSURE: 132 MMHG | DIASTOLIC BLOOD PRESSURE: 73 MMHG | RESPIRATION RATE: 17 BRPM

## 2024-03-06 LAB
ANION GAP SERPL CALC-SCNC: 13 MMOL/L — SIGNIFICANT CHANGE UP (ref 7–14)
BASOPHILS # BLD AUTO: 0.02 K/UL — SIGNIFICANT CHANGE UP (ref 0–0.2)
BASOPHILS NFR BLD AUTO: 0.2 % — SIGNIFICANT CHANGE UP (ref 0–2)
BUN SERPL-MCNC: 8 MG/DL — SIGNIFICANT CHANGE UP (ref 7–23)
CALCIUM SERPL-MCNC: 8.7 MG/DL — SIGNIFICANT CHANGE UP (ref 8.4–10.5)
CHLORIDE SERPL-SCNC: 102 MMOL/L — SIGNIFICANT CHANGE UP (ref 98–107)
CO2 SERPL-SCNC: 23 MMOL/L — SIGNIFICANT CHANGE UP (ref 22–31)
CREAT SERPL-MCNC: 0.57 MG/DL — SIGNIFICANT CHANGE UP (ref 0.5–1.3)
EGFR: 103 ML/MIN/1.73M2 — SIGNIFICANT CHANGE UP
EOSINOPHIL # BLD AUTO: 0.01 K/UL — SIGNIFICANT CHANGE UP (ref 0–0.5)
EOSINOPHIL NFR BLD AUTO: 0.1 % — SIGNIFICANT CHANGE UP (ref 0–6)
GLUCOSE SERPL-MCNC: 99 MG/DL — SIGNIFICANT CHANGE UP (ref 70–99)
HCT VFR BLD CALC: 34.8 % — SIGNIFICANT CHANGE UP (ref 34.5–45)
HGB BLD-MCNC: 11.6 G/DL — SIGNIFICANT CHANGE UP (ref 11.5–15.5)
IANC: 8.06 K/UL — HIGH (ref 1.8–7.4)
IMM GRANULOCYTES NFR BLD AUTO: 0.4 % — SIGNIFICANT CHANGE UP (ref 0–0.9)
LYMPHOCYTES # BLD AUTO: 18.3 % — SIGNIFICANT CHANGE UP (ref 13–44)
LYMPHOCYTES # BLD AUTO: 2.04 K/UL — SIGNIFICANT CHANGE UP (ref 1–3.3)
MCHC RBC-ENTMCNC: 30.4 PG — SIGNIFICANT CHANGE UP (ref 27–34)
MCHC RBC-ENTMCNC: 33.3 GM/DL — SIGNIFICANT CHANGE UP (ref 32–36)
MCV RBC AUTO: 91.3 FL — SIGNIFICANT CHANGE UP (ref 80–100)
MONOCYTES # BLD AUTO: 1 K/UL — HIGH (ref 0–0.9)
MONOCYTES NFR BLD AUTO: 9 % — SIGNIFICANT CHANGE UP (ref 2–14)
NEUTROPHILS # BLD AUTO: 8.06 K/UL — HIGH (ref 1.8–7.4)
NEUTROPHILS NFR BLD AUTO: 72 % — SIGNIFICANT CHANGE UP (ref 43–77)
NRBC # BLD: 0 /100 WBCS — SIGNIFICANT CHANGE UP (ref 0–0)
NRBC # FLD: 0 K/UL — SIGNIFICANT CHANGE UP (ref 0–0)
PLATELET # BLD AUTO: 236 K/UL — SIGNIFICANT CHANGE UP (ref 150–400)
POTASSIUM SERPL-MCNC: 4 MMOL/L — SIGNIFICANT CHANGE UP (ref 3.5–5.3)
POTASSIUM SERPL-SCNC: 4 MMOL/L — SIGNIFICANT CHANGE UP (ref 3.5–5.3)
RBC # BLD: 3.81 M/UL — SIGNIFICANT CHANGE UP (ref 3.8–5.2)
RBC # FLD: 12.5 % — SIGNIFICANT CHANGE UP (ref 10.3–14.5)
SODIUM SERPL-SCNC: 138 MMOL/L — SIGNIFICANT CHANGE UP (ref 135–145)
WBC # BLD: 11.17 K/UL — HIGH (ref 3.8–10.5)
WBC # FLD AUTO: 11.17 K/UL — HIGH (ref 3.8–10.5)

## 2024-03-06 PROCEDURE — 74220 X-RAY XM ESOPHAGUS 1CNTRST: CPT | Mod: 26

## 2024-03-06 PROCEDURE — 71045 X-RAY EXAM CHEST 1 VIEW: CPT | Mod: 26

## 2024-03-06 RX ORDER — PANTOPRAZOLE SODIUM 20 MG/1
1 TABLET, DELAYED RELEASE ORAL
Refills: 0 | DISCHARGE

## 2024-03-06 RX ORDER — OXYCODONE HYDROCHLORIDE 5 MG/1
1 TABLET ORAL
Qty: 20 | Refills: 0
Start: 2024-03-06 | End: 2024-03-10

## 2024-03-06 RX ADMIN — Medication 10 MILLIGRAM(S): at 04:10

## 2024-03-06 RX ADMIN — Medication 1000 MILLIGRAM(S): at 00:12

## 2024-03-06 RX ADMIN — ONDANSETRON 4 MILLIGRAM(S): 8 TABLET, FILM COATED ORAL at 05:18

## 2024-03-06 RX ADMIN — HEPARIN SODIUM 5000 UNIT(S): 5000 INJECTION INTRAVENOUS; SUBCUTANEOUS at 05:18

## 2024-03-06 NOTE — PHYSICAL THERAPY INITIAL EVALUATION ADULT - GENERAL OBSERVATIONS, REHAB EVAL
Chart reviewed and cleared for PT by ALONSO Jasso. Pt received sitting in bedside chair in NAD, HR 90s.

## 2024-03-06 NOTE — DISCHARGE NOTE NURSING/CASE MANAGEMENT/SOCIAL WORK - NSDCFUADDAPPT_GEN_ALL_CORE_FT
Follow up with Dr. Gonzales in 1-2 weeks [phone]   have a chest xray prior to appointment and bring films  (you can arrange to have chest xray done on 2nd floor of [facility] before your follow up appointment on 3rd floor - speak with office staff to arrange)      Follow up with primary care provider in one week

## 2024-03-06 NOTE — DISCHARGE NOTE NURSING/CASE MANAGEMENT/SOCIAL WORK - NSDCPEFALRISK_GEN_ALL_CORE
For information on Fall & Injury Prevention, visit: [email or web address] OR  [email or web address] OR  https://www.cdc.gov/steadi/patient.html

## 2024-03-06 NOTE — PROGRESS NOTE ADULT - SUBJECTIVE AND OBJECTIVE BOX
Anesthesia Pain Management Service    SUBJECTIVE: Patient is doing well with IV PCA and no significant problems reported. Seen in chair, some pain lying flat and walking, no nausea or vomiting. PCA dc'd by primary team. Patient going home today.    Pain Scale Score	At rest: _0__ 	With Activity: __6_ 	[X ] Refer to charted pain scores    THERAPY:    [ ] IV PCA Morphine		[ ] 5 mg/mL	[ ] 1 mg/mL  [X ] IV PCA Hydromorphone	[ ] 5 mg/mL	[X ] 1 mg/mL  [ ] IV PCA Fentanyl		[ ] 50 micrograms/mL    Demand dose __0.2_ lockout __6_ (minutes) Continuous Rate _0__ Total: ___   mg used (in past 24 hrs)      MEDICATIONS  (STANDING):  heparin   Injectable 5000 Unit(s) SubCutaneous every 8 hours  metoclopramide Injectable 10 milliGRAM(s) IV Push every 6 hours  ondansetron Injectable 4 milliGRAM(s) IV Push every 6 hours    MEDICATIONS  (PRN):      OBJECTIVE:    Sedation Score:	[ X] Alert	[ ] Drowsy 	[ ] Arousable	[ ] Asleep	[ ] Unresponsive    Side Effects:	[X ] None	[ ] Nausea	[ ] Vomiting	[ ] Pruritus  		[ ] Other:    Vital Signs Last 24 Hrs  T(C): 36.6 (06 Mar 2024 05:38), Max: 36.9 (05 Mar 2024 16:55)  T(F): 97.8 (06 Mar 2024 05:38), Max: 98.5 (05 Mar 2024 16:55)  HR: 93 (06 Mar 2024 05:38) (66 - 98)  BP: 135/70 (06 Mar 2024 05:38) (116/70 - 151/74)  BP(mean): 89 (05 Mar 2024 17:00) (84 - 98)  RR: 18 (06 Mar 2024 05:38) (12 - 20)  SpO2: 98% (06 Mar 2024 05:38) (93% - 100%)    Parameters below as of 06 Mar 2024 05:38  Patient On (Oxygen Delivery Method): room air        ASSESSMENT/ PLAN    Therapy to  be:	[ ] Continue   [ X] Discontinued   [X ] Change to prn Analgesics    Documentation and Verification of current medications:   [X] Done	[ ] Not done, not elligible    Comments: PRN Oral/IV opioids and/or Adjuvant non-opioid medication to be ordered at this point. APS to sign off.    Progress Note written now but Patient was seen earlier.

## 2024-03-06 NOTE — PHYSICAL THERAPY INITIAL EVALUATION ADULT - GAIT DISTANCE, PT EVAL
100 feet
Pupils equal, round and reactive to light, Extra-ocular movement intact, eyes are clear b/l

## 2024-03-06 NOTE — DISCHARGE NOTE NURSING/CASE MANAGEMENT/SOCIAL WORK - PATIENT PORTAL LINK FT
You can access the FollowMyHealth Patient Portal offered by [facility] by registering at the following website: [email or web address]. By joining [facility]’s FollowMyHealth portal, you will also be able to view your health information using other applications (apps) compatible with our system.

## 2024-03-06 NOTE — PHYSICAL THERAPY INITIAL EVALUATION ADULT - PERTINENT HX OF CURRENT PROBLEM, REHAB EVAL
Patient is a 63 year old female S/P EGD, robotic assisted hiatal hernia repair with Toupet fundoplication.

## 2024-03-06 NOTE — PHYSICAL THERAPY INITIAL EVALUATION ADULT - ADDITIONAL COMMENTS
Pt lives with her  in an apartment with elevator access. Pt did not use an assistive device and was independent with ADLs prior. Pt reports no falls in the past 6 months.   Pt left sitting in bedside chair in NAD, call bell in reach and RN Louisa made aware.

## 2024-03-14 LAB — SURGICAL PATHOLOGY STUDY: SIGNIFICANT CHANGE UP

## 2024-03-21 ENCOUNTER — APPOINTMENT (OUTPATIENT)
Dept: RADIOLOGY | Facility: HOSPITAL | Age: 64
End: 2024-03-21

## 2024-03-21 ENCOUNTER — OUTPATIENT (OUTPATIENT)
Dept: OUTPATIENT SERVICES | Facility: HOSPITAL | Age: 64
LOS: 1 days | End: 2024-03-21
Payer: COMMERCIAL

## 2024-03-21 ENCOUNTER — APPOINTMENT (OUTPATIENT)
Dept: THORACIC SURGERY | Facility: CLINIC | Age: 64
End: 2024-03-21
Payer: COMMERCIAL

## 2024-03-21 ENCOUNTER — RESULT REVIEW (OUTPATIENT)
Age: 64
End: 2024-03-21

## 2024-03-21 VITALS
RESPIRATION RATE: 17 BRPM | SYSTOLIC BLOOD PRESSURE: 113 MMHG | BODY MASS INDEX: 29.44 KG/M2 | HEART RATE: 73 BPM | HEIGHT: 62 IN | DIASTOLIC BLOOD PRESSURE: 76 MMHG | OXYGEN SATURATION: 97 % | WEIGHT: 160 LBS

## 2024-03-21 DIAGNOSIS — D49.89 NEOPLASM OF UNSPECIFIED BEHAVIOR OF OTHER SPECIFIED SITES: Chronic | ICD-10-CM

## 2024-03-21 DIAGNOSIS — Z98.890 OTHER SPECIFIED POSTPROCEDURAL STATES: Chronic | ICD-10-CM

## 2024-03-21 DIAGNOSIS — K44.9 DIAPHRAGMATIC HERNIA WITHOUT OBSTRUCTION OR GANGRENE: ICD-10-CM

## 2024-03-21 PROBLEM — G43.909 MIGRAINE, UNSPECIFIED, NOT INTRACTABLE, WITHOUT STATUS MIGRAINOSUS: Chronic | Status: ACTIVE | Noted: 2024-02-28

## 2024-03-21 PROBLEM — E78.5 HYPERLIPIDEMIA, UNSPECIFIED: Chronic | Status: ACTIVE | Noted: 2024-02-28

## 2024-03-21 PROCEDURE — 71046 X-RAY EXAM CHEST 2 VIEWS: CPT | Mod: 26

## 2024-03-21 PROCEDURE — 99024 POSTOP FOLLOW-UP VISIT: CPT

## 2024-03-21 NOTE — COUNSELING
[Hygeine (Including Daily Shower)] : hygeine (including daily shower) [Importance of Regular Medical Follow-Up] : the importance of regular medical follow-up [No Heavy Lifting] : no heavy lifting (>15-20 lb. for 1 month or 25 lb. for 3 months from date of surgery) [S/S of infection] : signs and symptoms of infection (and to whom it should be reported) [Blood Pressure Control] : blood pressure control [Progressive Ambulation/Activity] : progressive ambulation/activity [Medication/Vitamin/Herb/Food Interaction] : medication/vitamin/herb/food interaction

## 2024-03-22 NOTE — PHYSICAL EXAM
[] : no respiratory distress [Respiration, Rhythm And Depth] : normal respiratory rhythm and effort [Auscultation Breath Sounds / Voice Sounds] : lungs were clear to auscultation bilaterally [Heart Rate And Rhythm] : heart rate was normal and rhythm regular [Exaggerated Use Of Accessory Muscles For Inspiration] : no accessory muscle use [Site: ___] : Site: [unfilled] [Clean] : clean [Dry] : dry [Healing Well] : healing well [No Edema] : no edema [Bleeding] : no active bleeding [Foul Odor] : no foul smell [Purulent Drainage] : no purulent drainage [Serosanguinous Drainage] : no serosanguinous drainage [Erythema] : not erythematous [Warm] : not warm [Tender] : not tender

## 2024-03-22 NOTE — REASON FOR VISIT
[de-identified] : EGD, laparoscopy, robotic assist, hiatal hernia repair, Toupet fundoplication [de-identified] : 3/5/24

## 2024-03-22 NOTE — ASSESSMENT
[FreeTextEntry1] : Ms. YULISSA TSE, 63 year old female, never smoker, w/ hx of asthma, arthritis, osteoporosis, HLD, s/p right chest wall resection of elatofibroma in 3/2020 at [facility], who presented with chronic acid reflux for many years with recent worsening symptoms, controlled by PPI.   EGD on 12/10/22 by Dr. Boom Dougherty, which revealed: - GE junction is located at 36 cm from the incisors. Small hiatal hernia.   CT Chest on 02/12/2024 (MSR): - The lungs demonstrate mild multi-focal sub-segmental atelectasis and/or scarring, but there is no focal site of pulmonary airspace consolidation.  - Bilateral pulmonary mosaic attenuation is a non-specific finding that may be seen in the setting of small airways and/or small vessels disease.  - A 2 mm nodule along the left major fissure (series 5, image 72) is non-specific and is statistically most likely benign (possibly a fissural lymph node).  - There is a 2 mm calcified granuloma along the left major fissure (series 5, image 76).   Upper GI Series on 02/20/2024 (MSR): - Small hiatal hernia.  Manometry on 2/13/24 by Dr. Lashay Florian. LES = 40 (13-43), UES = 117 (). Normal LES pressures w/ incomplete deglutitive relaxation. On supine, 90% swallows w/ absent peristalsis. 100% w/ incomplete bolus clearance by impedence analysis. 1 cm hiatal hernia. Study does not fulfill any motility disorder of the esophagus.  Now s/p EGD, laparoscopy, robotic assist, hiatal hernia repair, Toupet fundoplication on 3/5/24. Path of soft tissue, gastric fat pad and hernia sac, herniorrhaphy reveals benign adipose tissue. One reactive LN.  CXR today- Stable, post op changes.   She presents today for post-op visit. Patient reports that she had advanced to soft solids a few days ago and has subsequently experienced episodes of vomiting with the solids. Reports + BMs with no straining. Tolerating liquids. Today, patient denies worsening SOB, chest pain, cough, hemoptysis, fever, chills, night sweats, lightheadedness or dizziness.  I have reviewed the patient's medical records and diagnostic images at the time of this office consultation and have made the following recommendation. 1. Surgical sites healing well. Patient with peristalsis issues preoperatively. Instructed to revert back to full liquid diet for now; Small, frequent meals; No straining with Bowel Movements. Return to clinic in 1 months with Barium Esophagram to evaluate post op fundoplication. She is agreeable.   Recommendations reviewed with patient during this office visit, and all questions answered; Patient instructed on the importance of follow up and verbalizes understanding.  I, KAROLYN Yoon, personally performed the evaluation and management (E/M) services for this established patient. That E/M includes conducting the examination, assessing all new/exacerbated conditions, and establishing a new plan of care. Today, My ACP, Codi Collins, was here to observe my evaluation and management services for this patient to be followed going forward.

## 2024-03-22 NOTE — CONSULT LETTER
[Dear  ___] : Dear  [unfilled], [Consult Letter:] : I had the pleasure of evaluating your patient, [unfilled]. [Please see my note below.] : Please see my note below. [( Thank you for referring [unfilled] for consultation for _____ )] : Thank you for referring [unfilled] for consultation for [unfilled] [Consult Closing:] : Thank you very much for allowing me to participate in the care of this patient.  If you have any questions, please do not hesitate to contact me. [FreeTextEntry2] : Dr. Boom Dougherty [FreeTextEntry3] : Demarco Gonzales MD, MPH  System Director of Thoracic Surgery  Director of Comprehensive Lung and Foregut [place]  Professor Cardiovascular & Thoracic Surgery  [facility] [employer] at [facility] [phone] [place] [address] [place] [address] Tel: [phone] Fax: [phone]

## 2024-04-25 PROBLEM — K44.9 HERNIA, HIATAL: Status: ACTIVE | Noted: 2024-02-07

## 2024-04-29 ENCOUNTER — APPOINTMENT (OUTPATIENT)
Dept: RADIOLOGY | Facility: HOSPITAL | Age: 64
End: 2024-04-29
Payer: COMMERCIAL

## 2024-04-29 ENCOUNTER — OUTPATIENT (OUTPATIENT)
Dept: OUTPATIENT SERVICES | Facility: HOSPITAL | Age: 64
LOS: 1 days | End: 2024-04-29
Payer: COMMERCIAL

## 2024-04-29 DIAGNOSIS — D49.89 NEOPLASM OF UNSPECIFIED BEHAVIOR OF OTHER SPECIFIED SITES: Chronic | ICD-10-CM

## 2024-04-29 DIAGNOSIS — K44.9 DIAPHRAGMATIC HERNIA WITHOUT OBSTRUCTION OR GANGRENE: ICD-10-CM

## 2024-04-29 DIAGNOSIS — Z98.890 OTHER SPECIFIED POSTPROCEDURAL STATES: Chronic | ICD-10-CM

## 2024-04-29 PROCEDURE — 74220 X-RAY XM ESOPHAGUS 1CNTRST: CPT

## 2024-04-29 PROCEDURE — 74220 X-RAY XM ESOPHAGUS 1CNTRST: CPT | Mod: 26

## 2024-05-02 ENCOUNTER — APPOINTMENT (OUTPATIENT)
Dept: THORACIC SURGERY | Facility: CLINIC | Age: 64
End: 2024-05-02
Payer: COMMERCIAL

## 2024-05-02 VITALS
RESPIRATION RATE: 17 BRPM | HEART RATE: 57 BPM | DIASTOLIC BLOOD PRESSURE: 66 MMHG | WEIGHT: 160 LBS | BODY MASS INDEX: 29.44 KG/M2 | OXYGEN SATURATION: 97 % | SYSTOLIC BLOOD PRESSURE: 117 MMHG | HEIGHT: 62 IN

## 2024-05-02 DIAGNOSIS — K44.9 DIAPHRAGMATIC HERNIA W/OUT OBSTRUCTION OR GANGRENE: ICD-10-CM

## 2024-05-02 PROCEDURE — 99024 POSTOP FOLLOW-UP VISIT: CPT

## 2024-05-02 NOTE — ASSESSMENT
[FreeTextEntry1] : Ms. YULISSA TSE, 63 year old female, never smoker, w/ hx of asthma, arthritis, osteoporosis, HLD, s/p right chest wall resection of elatofibroma in 3/2020 at [facility], who presented with chronic acid reflux for many years with recent worsening symptoms, controlled by PPI.   EGD on 12/10/22 by Dr. Boom Dougherty, which revealed: - GE junction is located at 36 cm from the incisors. Small hiatal hernia.   CT Chest on 02/12/2024 (MSR): - The lungs demonstrate mild multi-focal sub-segmental atelectasis and/or scarring, but there is no focal site of pulmonary airspace consolidation.  - Bilateral pulmonary mosaic attenuation is a non-specific finding that may be seen in the setting of small airways and/or small vessels disease.  - A 2 mm nodule along the left major fissure (series 5, image 72) is non-specific and is statistically most likely benign (possibly a fissural lymph node).  - There is a 2 mm calcified granuloma along the left major fissure (series 5, image 76).   Upper GI Series on 02/20/2024 (MSR): - Small hiatal hernia.  Manometry on 2/13/24 by Dr. Lashay Florian. LES = 40 (13-43), UES = 117 (). Normal LES pressures w/ incomplete deglutitive relaxation. On supine, 90% swallows w/ absent peristalsis. 100% w/ incomplete bolus clearance by impedence analysis. 1 cm hiatal hernia. Study does not fulfill any motility disorder of the esophagus.  Now s/p EGD, laparoscopy, robotic assist, hiatal hernia repair, Toupet fundoplication on 3/5/24. Path of soft tissue, gastric fat pad and hernia sac, herniorrhaphy reveals benign adipose tissue. One reactive LN.  Patient with peristalsis issues preoperatively. Instructed to revert back to full liquid diet for now; Small, frequent meals; No straining with Bowel Movements. Return to clinic in 1 months with Barium Esophagram to evaluate post op fundoplication.   Barium esophagram on 4/29/24: - occasional esophageal spasm - unremarkable esophagram  I have reviewed the patient's medical records and diagnostic images at time of this office consultation and have made the following recommendation: 1. Barium reviewed, pt c/o frequent vomiting and food stuck, I will schedule a EGD dilation for evaluation.  2. Medical clearance    I, KAROLYN Yoon, personally performed the evaluation and management (E/M) services for this established patient who presents today with (a) new problem(s)/exacerbation of (an) existing condition(s).  That E/M includes conducting the examination, assessing all new/exacerbated conditions, and establishing a new plan of care.  Today, my ACP, Kareen Foss, ALDO-BC was here to observe my evaluation and management services for this new problem/exacerbated condition to be followed going forward.

## 2024-05-02 NOTE — REASON FOR VISIT
[de-identified] : EGD, laparoscopy, robotic assist, hiatal hernia repair, Toupet fundoplication [de-identified] : 3/5/24

## 2024-05-02 NOTE — PHYSICAL EXAM
[] : no respiratory distress [Auscultation Breath Sounds / Voice Sounds] : lungs were clear to auscultation bilaterally [Heart Rate And Rhythm] : heart rate was normal and rhythm regular [Heart Sounds] : normal S1 and S2 [Heart Sounds Gallop] : no gallops [Murmurs] : no murmurs [Heart Sounds Pericardial Friction Rub] : no pericardial rub [Site: ___] : Site: [unfilled] [Clean] : clean [Dry] : dry [Healing Well] : healing well [Bleeding] : no active bleeding [Foul Odor] : no foul smell [Purulent Drainage] : no purulent drainage [Serosanguinous Drainage] : no serosanguinous drainage [Erythema] : not erythematous [Warm] : not warm [Tender] : not tender [No Edema] : no edema

## 2024-05-02 NOTE — CONSULT LETTER
[Dear  ___] : Dear  [unfilled], [Consult Letter:] : I had the pleasure of evaluating your patient, [unfilled]. [( Thank you for referring [unfilled] for consultation for _____ )] : Thank you for referring [unfilled] for consultation for [unfilled] [Please see my note below.] : Please see my note below. [Consult Closing:] : Thank you very much for allowing me to participate in the care of this patient.  If you have any questions, please do not hesitate to contact me. [FreeTextEntry2] : Dr. Boom Dougherty [FreeTextEntry3] : Demarco Gonzales MD, MPH  System Director of Thoracic Surgery  Director of Comprehensive Lung and Foregut [place]  Professor Cardiovascular & Thoracic Surgery  [facility] [employer] at [facility] [phone] [place] [address] [place] [address] Tel: [phone] Fax: [phone]

## 2024-05-09 DIAGNOSIS — R11.0 NAUSEA: ICD-10-CM

## 2024-05-10 RX ORDER — METOCLOPRAMIDE 5 MG/1
5 TABLET ORAL 3 TIMES DAILY
Qty: 15 | Refills: 0 | Status: ACTIVE | COMMUNITY
Start: 2024-05-09 | End: 1900-01-01

## 2024-06-13 ENCOUNTER — APPOINTMENT (OUTPATIENT)
Dept: THORACIC SURGERY | Facility: HOSPITAL | Age: 64
End: 2024-06-13

## 2024-06-13 ENCOUNTER — OUTPATIENT (OUTPATIENT)
Dept: OUTPATIENT SERVICES | Facility: HOSPITAL | Age: 64
LOS: 1 days | Discharge: HOME | End: 2024-06-13
Payer: COMMERCIAL

## 2024-06-13 VITALS
RESPIRATION RATE: 10 BRPM | TEMPERATURE: 97 F | DIASTOLIC BLOOD PRESSURE: 57 MMHG | WEIGHT: 160.06 LBS | HEIGHT: 62 IN | SYSTOLIC BLOOD PRESSURE: 133 MMHG | OXYGEN SATURATION: 100 % | HEART RATE: 60 BPM

## 2024-06-13 VITALS — SYSTOLIC BLOOD PRESSURE: 133 MMHG | DIASTOLIC BLOOD PRESSURE: 71 MMHG | HEART RATE: 62 BPM | RESPIRATION RATE: 20 BRPM

## 2024-06-13 DIAGNOSIS — Z98.890 OTHER SPECIFIED POSTPROCEDURAL STATES: Chronic | ICD-10-CM

## 2024-06-13 DIAGNOSIS — K44.9 DIAPHRAGMATIC HERNIA WITHOUT OBSTRUCTION OR GANGRENE: ICD-10-CM

## 2024-06-13 DIAGNOSIS — D49.89 NEOPLASM OF UNSPECIFIED BEHAVIOR OF OTHER SPECIFIED SITES: Chronic | ICD-10-CM

## 2024-06-13 PROCEDURE — 43235 EGD DIAGNOSTIC BRUSH WASH: CPT

## 2024-06-13 RX ORDER — SIMETHICONE 80 MG/1
1 TABLET, CHEWABLE ORAL
Qty: 0 | Refills: 0 | DISCHARGE

## 2024-06-13 RX ORDER — OXYBUTYNIN CHLORIDE 5 MG
1 TABLET ORAL
Refills: 0 | DISCHARGE

## 2024-06-13 RX ORDER — PROPRANOLOL HCL 160 MG
1 CAPSULE, EXTENDED RELEASE 24HR ORAL
Refills: 0 | DISCHARGE

## 2024-06-13 RX ORDER — AZELASTINE 137 UG/1
2 SPRAY, METERED NASAL
Refills: 0 | DISCHARGE

## 2024-06-13 RX ORDER — ACETAMINOPHEN 500 MG
2 TABLET ORAL
Qty: 0 | Refills: 0 | DISCHARGE

## 2024-06-13 RX ORDER — SIMVASTATIN 20 MG/1
1 TABLET, FILM COATED ORAL
Refills: 0 | DISCHARGE

## 2024-06-13 RX ORDER — DICLOFENAC SODIUM 30 MG/G
2 GEL TOPICAL
Refills: 0 | DISCHARGE

## 2024-06-13 NOTE — BRIEF OPERATIVE NOTE - COMMENTS
I, Jose Luis Winkler PA-C provided direct first assist support to the surgeon during this surgical procedure. My involvement included positioning, prepping and draping the patient prior to surgery, ensuring clear visibility and exposure for the surgeon by using instruments such as retractors and suction, closing surgical incisions and dressing wounds. As well as other tasks as directed by the surgeon.

## 2024-07-05 ENCOUNTER — APPOINTMENT (OUTPATIENT)
Dept: THORACIC SURGERY | Facility: CLINIC | Age: 64
End: 2024-07-05
Payer: COMMERCIAL

## 2024-07-11 ENCOUNTER — APPOINTMENT (OUTPATIENT)
Dept: THORACIC SURGERY | Facility: CLINIC | Age: 64
End: 2024-07-11
Payer: COMMERCIAL

## 2024-07-11 DIAGNOSIS — K44.9 DIAPHRAGMATIC HERNIA W/OUT OBSTRUCTION OR GANGRENE: ICD-10-CM

## 2024-07-11 PROCEDURE — 99443: CPT

## 2024-07-24 NOTE — REASON FOR VISIT
[Follow-Up: _____] : a [unfilled] follow-up visit Pt has slight dark discoloration noted below left knee; no drainage

## 2024-07-25 ENCOUNTER — APPOINTMENT (OUTPATIENT)
Dept: THORACIC SURGERY | Facility: CLINIC | Age: 64
End: 2024-07-25
Payer: COMMERCIAL

## 2024-07-25 DIAGNOSIS — K44.9 DIAPHRAGMATIC HERNIA W/OUT OBSTRUCTION OR GANGRENE: ICD-10-CM

## 2024-07-25 PROCEDURE — G2211 COMPLEX E/M VISIT ADD ON: CPT | Mod: NC

## 2024-07-25 PROCEDURE — 99213 OFFICE O/P EST LOW 20 MIN: CPT

## 2024-07-25 NOTE — ASSESSMENT
[FreeTextEntry1] : Ms. YULISSA TSE, 63 year old female, never smoker, w/ hx of asthma, arthritis, osteoporosis, HLD, s/p right chest wall resection of elatofibroma in 3/2020 at [facility], who presented with chronic acid reflux for many years with recent worsening symptoms, controlled by PPI.  Now s/p EGD, laparoscopy, robotic assist, hiatal hernia repair, Toupet fundoplication on 3/5/24. Path of soft tissue, gastric fat pad and hernia sac, herniorrhaphy reveals benign adipose tissue. One reactive LN.  I have reviewed the patient's medical records and diagnostic images at time of this office consultation and have made the following recommendation: 1. Discussed that EGD showed no evidence of recurrence. Return to clinic in 6 months with barium study to re-evaluate. Discussed eating small, frequent meals; Eat slowly and chew thoroughly; Avoid gastric irritants and no straining with BMs. 2. Follow up with her pulmonologist for productive cough.  I, Dr. BRIGHT, KAROLYN CARVER, personally performed the evaluation and management (E/M) services for this established patient who presents today with (a) new problem(s)/exacerbation of (an) existing condition(s).  That E/M includes conducting the examination, assessing all new/exacerbated conditions, and establishing a new plan of care.  Today, my ACP, MENDEZ Smith, was here to observe my evaluation and management services for this new problem/exacerbated condition to be followed going forward.

## 2024-07-25 NOTE — ASSESSMENT
[FreeTextEntry1] : Ms. YULISSA TSE, 63 year old female, never smoker, w/ hx of asthma, arthritis, osteoporosis, HLD, s/p right chest wall resection of elatofibroma in 3/2020 at [facility], who presented with chronic acid reflux for many years with recent worsening symptoms, controlled by PPI.  Now s/p EGD, laparoscopy, robotic assist, hiatal hernia repair, Toupet fundoplication on 3/5/24. Path of soft tissue, gastric fat pad and hernia sac, herniorrhaphy reveals benign adipose tissue. One reactive LN.  I have reviewed the patient's medical records and diagnostic images at time of this office consultation and have made the following recommendation: 1. Discussed that EGD showed no evidence of recurrence. Return to clinic in 6 months with barium study to re-evaluate. Discussed eating small, frequent meals; Eat slowly and chew thoroughly; Avoid gastric irritants and no straining with BMs. 2. Follow up with her pulmonologist for productive cough.  I, Dr. BRIGHT, KAROLYN CARVER, personally performed the evaluation and management (E/M) services for this established patient who presents today with (a) new problem(s)/exacerbation of (an) existing condition(s).  That E/M includes conducting the examination, assessing all new/exacerbated conditions, and establishing a new plan of care.  Today, my ACP, MENDEZ Smith, was here to observe my evaluation and management services for this new problem/exacerbated condition to be followed going forward.

## 2024-07-25 NOTE — HISTORY OF PRESENT ILLNESS
[FreeTextEntry1] : Ms. YULISSA TSE, 63 year old female, never smoker, w/ hx of asthma, arthritis, osteoporosis, HLD, s/p right chest wall resection of elatofibroma in 3/2020 at [facility], who presented with chronic acid reflux for many years with recent worsening symptoms, controlled by PPI.  EGD on 12/10/22 by Dr. Boom Dougherty, which revealed: - GE junction is located at 36 cm from the incisors. Small hiatal hernia.  CT Chest on 02/12/2024 ([facility]): - The lungs demonstrate mild multi-focal sub-segmental atelectasis and/or scarring, but there is no focal site of pulmonary airspace consolidation. - Bilateral pulmonary mosaic attenuation is a non-specific finding that may be seen in the setting of small airways and/or small vessels disease. - A 2 mm nodule along the left major fissure (series 5, image 72) is non-specific and is statistically most likely benign (possibly a fissural lymph node). - There is a 2 mm calcified granuloma along the left major fissure (series 5, image 76).  Upper GI Series on 02/20/2024 ([facility]): - Small hiatal hernia.  Manometry on 2/13/24 by Dr. Lashay Florian. LES = 40 (13-43), UES = 117 (). Normal LES pressures w/ incomplete deglutitive relaxation. On supine, 90% swallows w/ absent peristalsis. 100% w/ incomplete bolus clearance by impedence analysis. 1 cm hiatal hernia. Study does not fulfill any motility disorder of the esophagus.  Now s/p EGD, laparoscopy, robotic assist, hiatal hernia repair, Toupet fundoplication on 3/5/24. Path of soft tissue, gastric fat pad and hernia sac, herniorrhaphy reveals benign adipose tissue. One reactive LN.  Patient with peristalsis issues preoperatively. Instructed to revert back to full liquid diet for now; Small, frequent meals; No straining with Bowel Movements. Return to clinic in 1 months with Barium Esophagram to evaluate post op fundoplication.  Barium esophagram on 4/29/24: - occasional esophageal spasm - unremarkable esophagram  Pt c/o frequent vomiting and food stuck.  Now s/p EGD on 6/13/24. There was an intact Toupet fundoplication seen.  There was no evidence of fluid wrap.  No evidence of recurrence.  Patient is here today for a follow up. She admits of mid epigastric pain 7-8/10 which comes and goes, and productive cough. Denies any acid reflux, food regurgitation, or dysphagia. Reports that she needs to eat slowly to avoid food getting stuck.   **PHQ-2 completed on 7/25/2024

## 2024-07-25 NOTE — ASSESSMENT
[FreeTextEntry1] : Ms. YULISSA TSE, 63 year old female, never smoker, w/ hx of asthma, arthritis, osteoporosis, HLD, s/p right chest wall resection of elatofibroma in 3/2020 at [facility], who presented with chronic acid reflux for many years with recent worsening symptoms, controlled by PPI.  Now s/p EGD, laparoscopy, robotic assist, hiatal hernia repair, Toupet fundoplication on 3/5/24. Path of soft tissue, gastric fat pad and hernia sac, herniorrhaphy reveals benign adipose tissue. One reactive LN.  I have reviewed the patient's medical records and diagnostic images at time of this office consultation and have made the following recommendation: 1. Discussed that EGD showed no evidence of recurrence. Return to clinic in 6 months with barium study to re-evaluate. Discussed eating small, frequent meals; Eat slowly and chew thoroughly; Avoid gastric irritants and no straining with BMs. 2. Follow up with her pulmonologist for productive cough.  I, Dr. BRIGHT, KAROLYN CARVER, personally performed the evaluation and management (E/M) services for this established patient who presents today with (a) new problem(s)/exacerbation of (an) existing condition(s).  That E/M includes conducting the examination, assessing all new/exacerbated conditions, and establishing a new plan of care.  Today, my ACP, MENDEZ Smith, was here to observe my evaluation and management services for this new problem/exacerbated condition to be followed going forward.

## 2024-07-25 NOTE — HISTORY OF PRESENT ILLNESS
[FreeTextEntry1] : Ms. YULISSA TSE, 63 year old female, never smoker, w/ hx of asthma, arthritis, osteoporosis, HLD, s/p right chest wall resection of elatofibroma in 3/2020 at [facility], who presented with chronic acid reflux for many years with recent worsening symptoms, controlled by PPI.  EGD on 12/10/22 by Dr. Boom Dougherty, which revealed: - GE junction is located at 36 cm from the incisors. Small hiatal hernia.  CT Chest on 02/12/2024 ([facility]R): - The lungs demonstrate mild multi-focal sub-segmental atelectasis and/or scarring, but there is no focal site of pulmonary airspace consolidation. - Bilateral pulmonary mosaic attenuation is a non-specific finding that may be seen in the setting of small airways and/or small vessels disease. - A 2 mm nodule along the left major fissure (series 5, image 72) is non-specific and is statistically most likely benign (possibly a fissural lymph node). - There is a 2 mm calcified granuloma along the left major fissure (series 5, image 76).  Upper GI Series on 02/20/2024 (MSR): - Small hiatal hernia.  Manometry on 2/13/24 by Dr. Lashay Florian. LES = 40 (13-43), UES = 117 (). Normal LES pressures w/ incomplete deglutitive relaxation. On supine, 90% swallows w/ absent peristalsis. 100% w/ incomplete bolus clearance by impedence analysis. 1 cm hiatal hernia. Study does not fulfill any motility disorder of the esophagus.  Now s/p EGD, laparoscopy, robotic assist, hiatal hernia repair, Toupet fundoplication on 3/5/24. Path of soft tissue, gastric fat pad and hernia sac, herniorrhaphy reveals benign adipose tissue. One reactive LN.  Patient with peristalsis issues preoperatively. Instructed to revert back to full liquid diet for now; Small, frequent meals; No straining with Bowel Movements. Return to clinic in 1 months with Barium Esophagram to evaluate post op fundoplication.  Barium esophagram on 4/29/24: - occasional esophageal spasm - unremarkable esophagram  Pt c/o frequent vomiting and food stuck.  Now s/p EGD on 6/13/24. There was an intact Toupet fundoplication seen.  There was no evidence of fluid wrap.  No evidence of recurrence.  Patient is here today for a follow up. She admits of mid epigastric pain 7-8/10 which comes and goes, and productive cough. Denies any acid reflux, food regurgitation, or dysphagia. Reports that she needs to eat slowly to avoid food getting stuck.   **PHQ-2 completed on 7/25/2024

## 2025-02-13 ENCOUNTER — NON-APPOINTMENT (OUTPATIENT)
Age: 65
End: 2025-02-13

## 2025-02-13 ENCOUNTER — APPOINTMENT (OUTPATIENT)
Dept: THORACIC SURGERY | Facility: CLINIC | Age: 65
End: 2025-02-13

## 2025-02-13 DIAGNOSIS — K44.9 DIAPHRAGMATIC HERNIA W/OUT OBSTRUCTION OR GANGRENE: ICD-10-CM

## 2025-04-22 ENCOUNTER — NON-APPOINTMENT (OUTPATIENT)
Age: 65
End: 2025-04-22

## 2025-04-24 ENCOUNTER — NON-APPOINTMENT (OUTPATIENT)
Age: 65
End: 2025-04-24

## 2025-05-08 ENCOUNTER — OUTPATIENT (OUTPATIENT)
Dept: OUTPATIENT SERVICES | Facility: HOSPITAL | Age: 65
LOS: 1 days | End: 2025-05-08
Payer: COMMERCIAL

## 2025-05-08 ENCOUNTER — APPOINTMENT (OUTPATIENT)
Dept: RADIOLOGY | Facility: HOSPITAL | Age: 65
End: 2025-05-08

## 2025-05-08 DIAGNOSIS — Z98.890 OTHER SPECIFIED POSTPROCEDURAL STATES: Chronic | ICD-10-CM

## 2025-05-08 DIAGNOSIS — K44.9 DIAPHRAGMATIC HERNIA WITHOUT OBSTRUCTION OR GANGRENE: ICD-10-CM

## 2025-05-08 DIAGNOSIS — D49.89 NEOPLASM OF UNSPECIFIED BEHAVIOR OF OTHER SPECIFIED SITES: Chronic | ICD-10-CM

## 2025-05-08 PROCEDURE — 74220 X-RAY XM ESOPHAGUS 1CNTRST: CPT

## 2025-05-08 PROCEDURE — 74220 X-RAY XM ESOPHAGUS 1CNTRST: CPT | Mod: 26

## 2025-05-15 ENCOUNTER — APPOINTMENT (OUTPATIENT)
Dept: THORACIC SURGERY | Facility: CLINIC | Age: 65
End: 2025-05-15
Payer: COMMERCIAL

## 2025-05-15 VITALS
HEART RATE: 57 BPM | RESPIRATION RATE: 16 BRPM | BODY MASS INDEX: 30.73 KG/M2 | WEIGHT: 167 LBS | OXYGEN SATURATION: 98 % | HEIGHT: 62 IN | SYSTOLIC BLOOD PRESSURE: 142 MMHG | DIASTOLIC BLOOD PRESSURE: 66 MMHG

## 2025-05-15 DIAGNOSIS — K44.9 DIAPHRAGMATIC HERNIA W/OUT OBSTRUCTION OR GANGRENE: ICD-10-CM

## 2025-05-15 PROCEDURE — 99214 OFFICE O/P EST MOD 30 MIN: CPT

## (undated) DEVICE — ELCTR BOVIE PENCIL SMOKE EVACUATION

## (undated) DEVICE — XI ARM SCISSOR MONO CURVED

## (undated) DEVICE — BIOPSY FORCEP RADIAL JAW 4 STANDARD WITH NEEDLE

## (undated) DEVICE — TUBING SUCTION 20FT

## (undated) DEVICE — PACK ROBOTIC LIJ

## (undated) DEVICE — XI ARM CLIP APPLIER LARGE

## (undated) DEVICE — XI OBTURATOR OPTICAL BLADELESS 8MM

## (undated) DEVICE — XI ARM FORCEP TENACULUM

## (undated) DEVICE — LUBRICATING JELLY ONESHOT 1.25OZ

## (undated) DEVICE — TUBING SUCTION CONN 6FT STERILE

## (undated) DEVICE — SUCTION YANKAUER NO CONTROL VENT

## (undated) DEVICE — DRAPE TOWEL BLUE 17" X 24"

## (undated) DEVICE — BIOPSY FORCEP COLD DISP

## (undated) DEVICE — GRASPER LAPA 5MMX35CM

## (undated) DEVICE — TUBING SUCTION NONCONDUCTIVE 6MM X 12FT

## (undated) DEVICE — TUBING STRYKEFLOW II SUCTION / IRRIGATOR

## (undated) DEVICE — CHEST DRAIN OASIS DRY SUCTION WATER SEAL

## (undated) DEVICE — DRAPE 3/4 SHEET 52X76"

## (undated) DEVICE — POSITIONER STRAP ARMBOARD VELCRO TS-30

## (undated) DEVICE — XI ARM DISSECTOR CURVED BIPOLAR 8MM

## (undated) DEVICE — BITE BLOCK ADULT 20 X 27MM (GREEN)

## (undated) DEVICE — XI ARM FORCEP PROGRASP 8MM

## (undated) DEVICE — CHEST DRAIN PLEUR-EVAC DRY/WET ADULT-PEDS SINGLE (QUICK)

## (undated) DEVICE — VENODYNE/SCD SLEEVE CALF MEDIUM

## (undated) DEVICE — STERIS DEFENDO 3-PIECE KIT (AIR/WATER, SUCTION & BIOPSY VALVES)

## (undated) DEVICE — DRSG 2X2

## (undated) DEVICE — XI VESSEL SEALER

## (undated) DEVICE — XI ARM GRASPER TIP UP FENESTRATED

## (undated) DEVICE — UNDERPAD LINEN SAVER 17 X 24"

## (undated) DEVICE — XI SEAL UNIVERSIAL 5-12MM

## (undated) DEVICE — FOLEY HOLDER STATLOCK 2 WAY ADULT

## (undated) DEVICE — MARKING PEN W RULER

## (undated) DEVICE — XI ARM FORCEP CADIERE 8MM

## (undated) DEVICE — SUT SILK 2-0 30" SH

## (undated) DEVICE — BASIN EMESIS 10IN GRADUATED MAUVE

## (undated) DEVICE — ELCTR ECG CONDUCTIVE ADHESIVE

## (undated) DEVICE — TUBING IV SET GRAVITY 3Y 100" MACRO

## (undated) DEVICE — XI DRAPE COLUMN

## (undated) DEVICE — SALIVA EJECTOR (BLUE)

## (undated) DEVICE — CONTAINER FORMALIN 10% 20ML

## (undated) DEVICE — WARMING BLANKET LOWER ADULT

## (undated) DEVICE — SYR LUER LOK 50CC

## (undated) DEVICE — TUBING AIRSEAL TRI-LUMEN FILTERED

## (undated) DEVICE — DRAIN PENROSE .25" X 18" LATEX

## (undated) DEVICE — XI ARM CLIP APPLIER MEDIUM-LARGE

## (undated) DEVICE — TROCAR SURGIQUEST AIRSEAL 8MMX120MM

## (undated) DEVICE — BLADE SCALPEL SAFETYLOCK #10

## (undated) DEVICE — DRSG GAUZE PACKTNER ROLL

## (undated) DEVICE — D HELP - CLEARVIEW CLEARIFY SYSTEM

## (undated) DEVICE — ELCTR BOVIE TIP BLADE INSULATED 2.75" EDGE

## (undated) DEVICE — SUT SILK 0 24" SH DA

## (undated) DEVICE — LUBRICATING JELLY HR ONE SHOT 3G

## (undated) DEVICE — NDL HYPO REGULAR BEVEL 22G X 1.5" (TURQUOISE)

## (undated) DEVICE — SYR LUER LOK 5CC

## (undated) DEVICE — SUT VLOC PBT 0 18" GS-21 BLUE

## (undated) DEVICE — SPECIMEN CONTAINER 100ML

## (undated) DEVICE — DRSG BANDAID 0.75X3"

## (undated) DEVICE — TUBING SMARTCAP WITH CO2 ENDO 140/160/180/190 16"

## (undated) DEVICE — CLAMP BX HOT RAD JAW 3

## (undated) DEVICE — XI ARM FORCEP FENESTRATED BIPOLAR 8MM

## (undated) DEVICE — PACK IV START WITH CHG

## (undated) DEVICE — GUN DIL ALLIANCE

## (undated) DEVICE — SUT VICRYL 0 27" UR-6

## (undated) DEVICE — TUBING MEDI-VAC W MAXIGRIP CONNECTORS 1/4"X6'

## (undated) DEVICE — CATH IV SAFE BC 22G X 1" (BLUE)

## (undated) DEVICE — XI DRAPE ARM

## (undated) DEVICE — XI ARM NEEDLE DRIVER LARGE

## (undated) DEVICE — XI ARM PERMANENT CAUTERY SPATULA

## (undated) DEVICE — TUBING SMARTCAP ENDO OLYMPUS 140/160/180/190 2"

## (undated) DEVICE — XI ARM FORCEP MARYLAND BIPOLAR

## (undated) DEVICE — CONTAINER FORMALIN 80ML YELLOW

## (undated) DEVICE — SUT MONOCRYL 4-0 27" PS-2 UNDYED

## (undated) DEVICE — XI ARM NEEDLE DRIVER SUTURECUT MEGA 8MM

## (undated) DEVICE — SYR LUER LOK 20CC

## (undated) DEVICE — DENTURE CUP PINK

## (undated) DEVICE — XI ARM PERMANENT CAUTERY HOOK

## (undated) DEVICE — DRSG CURITY GAUZE SPONGE 4 X 4" 12-PLY NON-STERILE

## (undated) DEVICE — GOWN LG

## (undated) DEVICE — SUT VLOC 180 0 12" GS-21 GREEN